# Patient Record
Sex: FEMALE | Race: BLACK OR AFRICAN AMERICAN | Employment: UNEMPLOYED | URBAN - METROPOLITAN AREA
[De-identification: names, ages, dates, MRNs, and addresses within clinical notes are randomized per-mention and may not be internally consistent; named-entity substitution may affect disease eponyms.]

---

## 2017-01-04 ENCOUNTER — OFFICE VISIT (OUTPATIENT)
Dept: PAIN MANAGEMENT | Age: 47
End: 2017-01-04

## 2017-01-04 VITALS — SYSTOLIC BLOOD PRESSURE: 135 MMHG | DIASTOLIC BLOOD PRESSURE: 69 MMHG | HEART RATE: 53 BPM

## 2017-01-04 DIAGNOSIS — M54.42 CHRONIC BILATERAL LOW BACK PAIN WITH BILATERAL SCIATICA: Primary | ICD-10-CM

## 2017-01-04 DIAGNOSIS — M51.36 DDD (DEGENERATIVE DISC DISEASE), LUMBAR: ICD-10-CM

## 2017-01-04 DIAGNOSIS — M48.062 SPINAL STENOSIS, LUMBAR REGION, WITH NEUROGENIC CLAUDICATION: ICD-10-CM

## 2017-01-04 DIAGNOSIS — Z98.84 GASTRIC BYPASS STATUS FOR OBESITY: ICD-10-CM

## 2017-01-04 DIAGNOSIS — G47.01 INSOMNIA SECONDARY TO CHRONIC PAIN: ICD-10-CM

## 2017-01-04 DIAGNOSIS — G89.4 CHRONIC PAIN SYNDROME: ICD-10-CM

## 2017-01-04 DIAGNOSIS — M47.816 OSTEOARTHRITIS OF LUMBAR SPINE, UNSPECIFIED SPINAL OSTEOARTHRITIS COMPLICATION STATUS: ICD-10-CM

## 2017-01-04 DIAGNOSIS — G89.29 INSOMNIA SECONDARY TO CHRONIC PAIN: ICD-10-CM

## 2017-01-04 DIAGNOSIS — M54.41 CHRONIC BILATERAL LOW BACK PAIN WITH BILATERAL SCIATICA: Primary | ICD-10-CM

## 2017-01-04 DIAGNOSIS — G89.29 CHRONIC BILATERAL LOW BACK PAIN WITH BILATERAL SCIATICA: Primary | ICD-10-CM

## 2017-01-04 RX ORDER — FENTANYL 100 UG/H
1 PATCH TRANSDERMAL
Qty: 15 PATCH | Refills: 0 | Status: SHIPPED | OUTPATIENT
Start: 2017-02-03 | End: 2017-03-30 | Stop reason: SDUPTHER

## 2017-01-04 RX ORDER — OXYCODONE AND ACETAMINOPHEN 10; 325 MG/1; MG/1
1 TABLET ORAL
Qty: 150 TAB | Refills: 0 | Status: SHIPPED | OUTPATIENT
Start: 2017-03-02 | End: 2017-03-30 | Stop reason: SDUPTHER

## 2017-01-04 RX ORDER — ZOLPIDEM TARTRATE 12.5 MG/1
12.5 TABLET, FILM COATED, EXTENDED RELEASE ORAL
Qty: 30 TAB | Refills: 2 | Status: SHIPPED | OUTPATIENT
Start: 2017-01-04 | End: 2017-03-30

## 2017-01-04 RX ORDER — GABAPENTIN 400 MG/1
400 CAPSULE ORAL 3 TIMES DAILY
Qty: 90 CAP | Refills: 2 | Status: SHIPPED | OUTPATIENT
Start: 2017-01-04 | End: 2017-02-03

## 2017-01-04 RX ORDER — FENTANYL 100 UG/H
1 PATCH TRANSDERMAL
Qty: 15 PATCH | Refills: 0 | Status: SHIPPED | OUTPATIENT
Start: 2017-03-02 | End: 2017-03-30 | Stop reason: SDUPTHER

## 2017-01-04 RX ORDER — OXYCODONE AND ACETAMINOPHEN 10; 325 MG/1; MG/1
1 TABLET ORAL
Qty: 150 TAB | Refills: 0 | Status: SHIPPED | OUTPATIENT
Start: 2017-02-03 | End: 2017-03-30 | Stop reason: SDUPTHER

## 2017-01-04 RX ORDER — METAXALONE 800 MG/1
TABLET ORAL
Qty: 90 TAB | Refills: 3 | Status: SHIPPED | OUTPATIENT
Start: 2017-01-04 | End: 2017-03-30

## 2017-01-04 RX ORDER — OXYCODONE AND ACETAMINOPHEN 10; 325 MG/1; MG/1
1 TABLET ORAL
Qty: 150 TAB | Refills: 0 | Status: SHIPPED | OUTPATIENT
Start: 2017-01-04 | End: 2017-03-30 | Stop reason: SDUPTHER

## 2017-01-04 RX ORDER — FENTANYL 100 UG/H
1 PATCH TRANSDERMAL
Qty: 15 PATCH | Refills: 0 | Status: SHIPPED | OUTPATIENT
Start: 2017-01-04 | End: 2017-03-30 | Stop reason: SDUPTHER

## 2017-01-04 NOTE — MR AVS SNAPSHOT
Visit Information Date & Time Provider Department Dept. Phone Encounter #  
 1/4/2017 10:45 AM Wilmer Carballo MD [address] [place] [address] Pain Management [phone] Follow-up Instructions Return in about 2 months (around 3/4/2017). Upcoming Health Maintenance Date Due DTaP/Tdap/Td series (1 - Tdap) 5/31/1989 PAP AKA CERVICAL CYTOLOGY 5/31/1989 INFLUENZA AGE 9 TO ADULT 8/1/2016 Allergies as of 1/4/2017  Review Complete On: 1/4/2017 By: Wilmer Carballo MD  
  
 Severity Noted Reaction Type Reactions Latex  10/31/2011    Rash Ibuprofen  06/26/2014    Nausea and Vomiting Current Immunizations  Never Reviewed No immunizations on file. Not reviewed this visit You Were Diagnosed With   
  
 Codes Comments Chronic bilateral low back pain with bilateral sciatica    -  Primary ICD-10-CM: M54.42, M54.41, G89.29 ICD-9-CM: 724.2, 724.3, 338.29 Insomnia secondary to chronic pain     ICD-10-CM: G89.29, G47.01 
ICD-9-CM: 338.29, 327.01 Chronic pain syndrome     ICD-10-CM: G89.4 ICD-9-CM: 338.4 Gastric bypass status for obesity     ICD-10-CM: Z98.84 ICD-9-CM: V45.86 Osteoarthritis of lumbar spine, unspecified spinal osteoarthritis complication status     CDH-62-FT: M47.816 ICD-9-CM: 721.3 DDD (degenerative disc disease), lumbar     ICD-10-CM: M51.36 
ICD-9-CM: 722.52 Spinal stenosis, lumbar region, with neurogenic claudication     ICD-10-CM: M48.06 
ICD-9-CM: 724.03 Vitals BP Pulse OB Status Smoking Status 135/69 (!) 53 Hysterectomy Never Smoker Vitals History Preferred Pharmacy Pharmacy Name Phone [facility], [address] Your Updated Medication List  
  
   
This list is accurate as of: 1/4/17 11:49 AM.  Always use your most recent med list.  
  
  
  
  
 Back Brace Misc 1 Units by Does Not Apply route daily. As directed * fentaNYL 100 mcg/hr PATCH Commonly known as:  DURAGESIC  
1 Patch by TransDERmal route every fourty-eight (48) hours for 30 days. For chronic pain. Mylan, Sandoz, or Mallinckrodt brands only. * fentaNYL 100 mcg/hr PATCH Commonly known as:  DURAGESIC  
1 Patch by TransDERmal route every fourty-eight (48) hours for 30 days. For chronic pain. Mylan, Sandoz, or Mallinckrodt brands only. Start taking on:  2/3/2017 * fentaNYL 100 mcg/hr PATCH Commonly known as:  DURAGESIC  
1 Patch by TransDERmal route every fourty-eight (48) hours for 30 days. For chronic pain. For chronic pain. Mylan, Sandoz, or Mallinckrodt brands only. Start taking on:  3/2/2017  
  
 gabapentin 400 mg capsule Commonly known as:  NEURONTIN Take 1 Cap by mouth three (3) times daily for 30 days. metaxalone 800 mg tablet Commonly known as:  SKELAXIN  
TAKE ONE TABLET BY MOUTH THREE TIMES A DAY  
  
 * oxyCODONE-acetaminophen  mg per tablet Commonly known as:  PERCOCET 10 Take 1 Tab by mouth five (5) times daily for 30 days. Max Daily Amount: 5 Tabs. * oxyCODONE-acetaminophen  mg per tablet Commonly known as:  PERCOCET 10 Take 1 Tab by mouth five (5) times daily for 30 days. Max Daily Amount: 5 Tabs. Start taking on:  2/3/2017 * oxyCODONE-acetaminophen  mg per tablet Commonly known as:  PERCOCET 10 Take 1 Tab by mouth five (5) times daily for 30 days. Max Daily Amount: 5 Tabs. Start taking on:  3/2/2017 ZOLOFT 50 mg tablet Generic drug:  sertraline Take 50 mg by mouth daily. zolpidem CR 12.5 mg tablet Commonly known as:  AMBIEN CR Take 1 Tab by mouth nightly as needed for Sleep. * Notice: This list has 6 medication(s) that are the same as other medications prescribed for you. Read the directions carefully, and ask your doctor or other care provider to review them with you. Prescriptions Printed Refills  
 fentaNYL (DURAGESIC) 100 mcg/hr PATCH 0 Si Patch by TransDERmal route every fourty-eight (48) hours for 30 days. For chronic pain. Mylan, Sandoz, or Mallinckrodt brands only. Class: Print Route: TransDERmal  
 oxyCODONE-acetaminophen (PERCOCET 10)  mg per tablet 0 Sig: Take 1 Tab by mouth five (5) times daily for 30 days. Max Daily Amount: 5 Tabs. Class: Print Route: Oral  
 zolpidem CR (AMBIEN CR) 12.5 mg tablet 2 Sig: Take 1 Tab by mouth nightly as needed for Sleep. Class: Print Route: Oral  
 fentaNYL (DURAGESIC) 100 mcg/hr PATCH 0 Starting on: 3/2/2017 Si Patch by TransDERmal route every fourty-eight (48) hours for 30 days. For chronic pain. For chronic pain. Mylan, Sandoz, or Mallinckrodt brands only. Class: Print Route: TransDERmal  
 oxyCODONE-acetaminophen (PERCOCET 10)  mg per tablet 0 Starting on: 3/2/2017 Sig: Take 1 Tab by mouth five (5) times daily for 30 days. Max Daily Amount: 5 Tabs. Class: Print Route: Oral  
 fentaNYL (DURAGESIC) 100 mcg/hr PATCH 0 Starting on: 2/3/2017 Si Patch by TransDERmal route every fourty-eight (48) hours for 30 days. For chronic pain. Mylan, Sandoz, or Mallinckrodt brands only. Class: Print Route: TransDERmal  
 oxyCODONE-acetaminophen (PERCOCET 10)  mg per tablet 0 Starting on: 2/3/2017 Sig: Take 1 Tab by mouth five (5) times daily for 30 days. Max Daily Amount: 5 Tabs. Class: Print Route: Oral  
  
Prescriptions Sent to Pharmacy Refills  
 metaxalone (SKELAXIN) 800 mg tablet 3 Sig: TAKE ONE TABLET BY MOUTH THREE TIMES A DAY Class: Normal  
 Pharmacy: [address] [place] [address], [address] [place] [address] Ph #: [phone]  
 gabapentin (NEURONTIN) 400 mg capsule 2 Sig: Take 1 Cap by mouth three (3) times daily for 30 days.   
 Class: Normal  
 Pharmacy: [facility], [facility] #: [phone] Route: Oral  
  
Follow-up Instructions Return in about 2 months (around 3/4/2017). Introducing [facility] SERVICES! [address] introduces [employer] patient portal. Now you can access parts of your medical record, email your doctor's office, and request medication refills online. 1. In your internet browser, go to [email or web address] 2. Click on the First Time User? Click Here link in the Sign In box. You will see the New Member Sign Up page. 3. Enter your [employer] Access Code exactly as it appears below. You will not need to use this code after youve completed the sign-up process. If you do not sign up before the expiration date, you must request a new code. · [employer] Access Code: [email or web address] Expires: 4/4/2017 11:45 AM 
 
4. Enter the last four digits of your Social Security Number (xxxx) and Date of Birth (mm/dd/yyyy) as indicated and click Submit. You will be taken to the next sign-up page. 5. Create a [employer] ID. This will be your [employer] login ID and cannot be changed, so think of one that is secure and easy to remember. 6. Create a [employer] password. You can change your password at any time. 7. Enter your Password Reset Question and Answer. This can be used at a later time if you forget your password. 8. Enter your e-mail address. You will receive e-mail notification when new information is available in [address] 9. Click Sign Up. You can now view and download portions of your medical record. 10. Click the Download Summary menu link to download a portable copy of your medical information. If you have questions, please visit the Frequently Asked Questions section of the [employer] website. Remember, [employer] is NOT to be used for urgent needs. For medical emergencies, dial 911. Now available from your iPhone and Android! Please provide this summary of care documentation to your next provider. Your primary care clinician is listed as BERTO GONZALEZ. If you have any questions after today's visit, please call [phone].

## 2017-01-04 NOTE — PROGRESS NOTES
Nursing Notes    Patient presents to the office today in follow-up. Reviewed medications with counts as follows:    Rx Date filled Qty Dispensed Pill Count Last Dose Short   duragesic 100 mcg 12/21/16 15 7 1 on no   Percocet 10/325 12/13/16 150 37 Today. 3 doses no   Ms. Geo Aden has a reminder for a \"due or due soon\" health maintenance. I have asked that she contact her primary care provider for follow-up on this health maintenance. POC UDS was not performed in office today    Any new labs or imaging since last appointment? NO    Have you been to an emergency room (ER) or urgent care clinic since your last visit? NO            Have you been hospitalized since your last visit? NO     If yes, where, when, and reason for visit? Have you seen or consulted any other health care providers outside of the [facility]  since your last visit? NO     If yes, where, when, and reason for visit?

## 2017-01-04 NOTE — PROGRESS NOTES
HISTORY OF PRESENT ILLNESS  Real Carcamo is a 48 y.o. female. HPI Comments: Meds help with pain control and quality of life. No new side effects reported today. No new medical problems reported today. Visit survey reviewed, and will be scanned. Recent Average pain level (out of 10). -- 7  Chief complaint, low back pain  Chronic pain  Using fentanyl patch 100 µg every 48 hour  Percocet 10 mg 5 times a day as needed  Metaxalone 800 mg 3 times a day as needed  Extended release Ambien 12.5 mg to help with sleep as needed  Gabapentin 400 mg 3 times a day  Medication helps improve general activity, mood, walking, sleep  She was previously being seen every 3 months, she would like to return back to, every 3 months             Review of Systems   Constitutional: Negative for chills and fever. HENT: Negative for ear discharge and ear pain. Eyes: Negative for pain and discharge. Respiratory: Negative for hemoptysis and shortness of breath. Cardiovascular: Negative for chest pain and leg swelling. Gastrointestinal: Negative for blood in stool and melena. Musculoskeletal: Positive for back pain. Negative for falls. Skin: Negative for itching and rash. Neurological: Negative for dizziness. Psychiatric/Behavioral: Negative for hallucinations and suicidal ideas. Physical Exam   Constitutional: She appears well-developed and well-nourished. She is cooperative. She does not have a sickly appearance. HENT:   Head: Normocephalic and atraumatic. Right Ear: External ear normal. No drainage. Left Ear: External ear normal. No drainage. Nose: Nose normal.   Eyes: Lids are normal. Right eye exhibits no discharge. Left eye exhibits no discharge. Right conjunctiva has no hemorrhage. Left conjunctiva has no hemorrhage. Neck: Neck supple. No tracheal deviation present. No thyroid mass present. Pulmonary/Chest: Effort normal. No respiratory distress. Neurological: She is alert.  No cranial nerve deficit. Skin: Skin is intact. No rash noted. Psychiatric: Her speech is normal. Her affect is not angry. She does not express inappropriate judgment. Nursing note and vitals reviewed. ASSESSMENT and PLAN  Encounter Diagnoses   Name Primary?  Chronic bilateral low back pain with bilateral sciatica Yes    Insomnia secondary to chronic pain     Chronic pain syndrome     Gastric bypass status for obesity     Osteoarthritis of lumbar spine, unspecified spinal osteoarthritis complication status     DDD (degenerative disc disease), lumbar     Spinal stenosis, lumbar region, with neurogenic claudication    No signs of addiction or diversion. The primary Dxes. ,including pain are controlled. Pain/Pain control/Meds and Quality Of Life have been reviewed. Nonpharmacologic therapy and non-opioid pharmacologic therapy are always considered. If opioid therapy is prescribed, this is only if the expected benefits for both pain and function are anticipated to outweigh risks. Possible changes to treatment plan considered. Support/education given as needed. Today-medications are as listed. No significant changes to medications. Follow up -- 3 months.

## 2017-02-12 ENCOUNTER — HOSPITAL ENCOUNTER (EMERGENCY)
Age: 47
Discharge: HOME | End: 2017-02-12
Attending: EMERGENCY MEDICINE
Payer: MEDICARE

## 2017-02-12 VITALS
SYSTOLIC BLOOD PRESSURE: 145 MMHG | HEART RATE: 63 BPM | RESPIRATION RATE: 18 BRPM | BODY MASS INDEX: 31.92 KG/M2 | DIASTOLIC BLOOD PRESSURE: 89 MMHG | TEMPERATURE: 98 F | OXYGEN SATURATION: 99 % | HEIGHT: 64 IN | WEIGHT: 187 LBS

## 2017-02-12 DIAGNOSIS — G89.29 CHRONIC LEFT-SIDED LOW BACK PAIN WITH LEFT-SIDED SCIATICA: Primary | ICD-10-CM

## 2017-02-12 DIAGNOSIS — M54.42 CHRONIC LEFT-SIDED LOW BACK PAIN WITH LEFT-SIDED SCIATICA: Primary | ICD-10-CM

## 2017-02-12 PROCEDURE — 99282 EMERGENCY DEPT VISIT SF MDM: CPT

## 2017-02-12 NOTE — ED NOTES
I have reviewed discharge instructions with the patient. The patient verbalized understanding. Current Discharge Medication List      CONTINUE these medications which have NOT CHANGED    Details   metaxalone (SKELAXIN) 800 mg tablet TAKE ONE TABLET BY MOUTH THREE TIMES A DAY  Qty: 90 Tab, Refills: 3      zolpidem CR (AMBIEN CR) 12.5 mg tablet Take 1 Tab by mouth nightly as needed for Sleep. Qty: 30 Tab, Refills: 2    Associated Diagnoses: Insomnia secondary to chronic pain      !! fentaNYL (DURAGESIC) 100 mcg/hr PATCH 1 Patch by TransDERmal route every fourty-eight (48) hours for 30 days. For chronic pain. For chronic pain. Mylan, Sandoz, or Mallinckrodt brands only. Qty: 15 Patch, Refills: 0      !! oxyCODONE-acetaminophen (PERCOCET 10)  mg per tablet Take 1 Tab by mouth five (5) times daily for 30 days. Max Daily Amount: 5 Tabs. Qty: 150 Tab, Refills: 0      !! fentaNYL (DURAGESIC) 100 mcg/hr PATCH 1 Patch by TransDERmal route every fourty-eight (48) hours for 30 days. For chronic pain. Mylan, Sandoz, or Mallinckrodt brands only. Qty: 15 Patch, Refills: 0      !! oxyCODONE-acetaminophen (PERCOCET 10)  mg per tablet Take 1 Tab by mouth five (5) times daily for 30 days. Max Daily Amount: 5 Tabs. Qty: 150 Tab, Refills: 0      Back Brace misc 1 Units by Does Not Apply route daily. As directed  Qty: 1 Device, Refills: 1      sertraline (ZOLOFT) 50 mg tablet Take 50 mg by mouth daily. !! - Potential duplicate medications found. Please discuss with provider.       Patient armband removed and shredded

## 2017-02-12 NOTE — DISCHARGE INSTRUCTIONS
Learning About How to Have a Healthy Back  What causes back pain? Back pain is often caused by overuse, strain, or injury. For example, people often hurt their backs playing sports or working in the yard, being jolted in a car accident, or lifting something too heavy. Aging plays a part too. Your bones and muscles tend to lose strength as you age, which makes injury more likely. The spongy discs between the bones of the spine (vertebrae) may suffer from wear and tear and no longer provide enough cushion between the bones. A disc that bulges or breaks open (herniated disc) can press on nerves, causing back pain. In some people, back pain is the result of arthritis, broken vertebrae caused by bone loss (osteoporosis), illness, or a spine problem. Although most people have back pain at one time or another, there are steps you can take to make it less likely. How can you have a healthy back? Reduce stress on your back through good posture  Slumping or slouching alone may not cause low back pain. But after the back has been strained or injured, bad posture can make pain worse. · Sleep in a position that maintains your back's normal curves and on a mattress that feels comfortable. Sleep on your side with a pillow between your knees, or sleep on your back with a pillow under your knees. These positions can reduce strain on your back. · Stand and sit up straight. \"Good posture\" generally means your ears, shoulders, and hips are in a straight line. · If you must stand for a long time, put one foot on a stool, ledge, or box. Switch feet every now and then. · Sit in a chair that is low enough to let you place both feet flat on the floor with both knees nearly level with your hips. If your chair or desk is too high, use a footrest to raise your knees. Place a small pillow, a rolled-up towel, or a lumbar roll in the curve of your back if you need extra support.   · Try a kneeling chair, which helps tilt your hips forward. This takes pressure off your lower back. · Try sitting on an exercise ball. It can rock from side to side, which helps keep your back loose. · When driving, keep your knees nearly level with your hips. Sit straight, and drive with both hands on the steering wheel. Your arms should be in a slightly bent position. Reduce stress on your back through careful lifting  · Squat down, bending at the hips and knees only. If you need to, put one knee to the floor and extend your other knee in front of you, bent at a right angle (half kneeling). · Press your chest straight forward. This helps keep your upper back straight while keeping a slight arch in your low back. · Hold the load as close to your body as possible, at the level of your belly button (navel). · Use your feet to change direction, taking small steps. · Lead with your hips as you change direction. Keep your shoulders in line with your hips as you move. · Set down your load carefully, squatting with your knees and hips only. Exercise and stretch your back  · Do some exercise on most days of the week, if your doctor says it is okay. You can walk, run, swim, or cycle. · Stretch your back muscles. Here are a few exercises to try:  Edgar Dys on your back, and gently pull one bent knee to your chest. Put that foot back on the floor, and then pull the other knee to your chest.  ¨ Do pelvic tilts. Lie on your back with your knees bent. Tighten your stomach muscles. Pull your belly button (navel) in and up toward your ribs. You should feel like your back is pressing to the floor and your hips and pelvis are slightly lifting off the floor. Hold for 6 seconds while breathing smoothly. ¨ Sit with your back flat against a wall. · Keep your core muscles strong. The muscles of your back, belly (abdomen), and buttocks support your spine. ¨ Pull in your belly and imagine pulling your navel toward your spine. Hold this for 6 seconds, then relax.  Remember to keep breathing normally as you tense your muscles. ¨ Do curl-ups. Always do them with your knees bent. Keep your low back on the floor, and curl your shoulders toward your knees using a smooth, slow motion. Keep your arms folded across your chest. If this bothers your neck, try putting your hands behind your neck (not your head), with your elbows spread apart. ¨ Lie on your back with your knees bent and your feet flat on the floor. Tighten your belly muscles, and then push with your feet and raise your buttocks up a few inches. Hold this position 6 seconds as you continue to breathe normally, then lower yourself slowly to the floor. Repeat 8 to 12 times. ¨ If you like group exercise, try Pilates or yoga. These classes have poses that strengthen the core muscles. Lead a healthy lifestyle  · Stay at a healthy weight to avoid strain on your back. · Do not smoke. Smoking increases the risk of osteoporosis, which weakens the spine. If you need help quitting, talk to your doctor about stop-smoking programs and medicines. These can increase your chances of quitting for good. Where can you learn more? Go to http://christiane-maryanne.info/. Enter L315 in the search box to learn more about \"Learning About How to Have a Healthy Back. \"  Current as of: May 23, 2016  Content Version: 11.1  © 5824-8178 [employer]. Care instructions adapted under license by [employer] (which disclaims liability or warranty for this information). If you have questions about a medical condition or this instruction, always ask your healthcare professional. [place] [address] any warranty or liability for your use of this information.

## 2017-02-12 NOTE — ED PROVIDER NOTES
Patient is a 48 y.o. female presenting with hip pain, knee pain, and leg pain. The history is provided by the patient. Hip Injury      Knee Pain      Leg Pain      Belinda Hoffman is a 48 y.o. female presents with left leg and knee pain. States has been going on for the past three days. Denies trauma. Pt has history of leg and knee pain. Pt is currently being seen by Pain Management. States has not been to see pain management for this complaint.  has not seen ortho.  has had knee injection in the past and wants a knee injection at this time.  pain medication that she is given is not working and would like new pain medication. Past Medical History:   Diagnosis Date    Anemia     Chronic low back pain     Constipation     Dysmenorrhea     Eczema     Fatigue     Insomnia     Iron deficiency anemia     Neurological disorder      pain, difficulty writing    Secondary thrombocytosis        Past Surgical History:   Procedure Laterality Date    Hx breast reduction      Hx tubal ligation       bilateral    Hx hysterectomy      Hx hysterectomy  2012    Hx gastric bypass  1997    Hx abdominoplasty           Family History:   Problem Relation Age of Onset    Cancer Other      NOS    Heart Disease Other      NOS    Stroke Father     Heart Attack Father     Breast Cancer Mother        Social History     Social History    Marital status:      Spouse name: N/A    Number of children: N/A    Years of education: N/A     Occupational History    Not on file. Social History Main Topics    Smoking status: Never Smoker    Smokeless tobacco: Not on file    Alcohol use No    Drug use: No    Sexual activity: Yes     Birth control/ protection: Surgical      Comment: Hysterectomy     Other Topics Concern    Not on file     Social History Narrative         ALLERGIES: Latex and Ibuprofen    Review of Systems  Constitutional:  Denies malaise, fever, chills.    Head:  Denies injury. Face:  Denies injury or pain. ENMT:  Denies sore throat. Neck:  Denies injury or pain. Chest:  Denies injury. Cardiac:  Denies chest pain or palpitations. Respiratory:  Denies cough, wheezing, difficulty breathing, shortness of breath. GI/ABD:  Denies injury, pain, distention, nausea, vomiting, diarrhea. :  Denies injury, pain, dysuria or urgency. Back:  pain  Pelvis:  Denies injury or pain. Extremity/MS:  hip, leg pain  Neuro:  Denies headache, LOC, dizziness, neurologic symptoms/deficits/paresthesias. Skin: Denies injury, rash, itching or skin changes. Vitals:    02/12/17 1146   BP: 145/89   Pulse: 63   Resp: 18   Temp: 98 °F (36.7 °C)   SpO2: 99%   Weight: 84.8 kg (187 lb)   Height: 5' 4\" (1.626 m)            Physical Exam   Nursing note and vitals reviewed. CONSTITUTIONAL: Alert, in no apparent distress; well-developed; well-nourished. HEAD:  Normocephalic, atraumatic. RESP: Chest clear, equal breath sounds. CV: S1 and S2 WNL; No murmurs, gallops or rubs. GI: Abdomen soft and non-tender. No masses or organomegaly. BACK: FROM, 5/5 strength, 2+DTR's, mild lumbar paraspinal tenderness no erythema, no edema, no ecchymosis,(-) deformity, swelling, skin changes, midline tenderness or crepitus. (-) step offs. Neg SLR bilaterlly. Full sensation to deep and light palpation bilaterally. (-) foot drop, Bilateral hips FROM, mild tenderness over left buttock  UPPER EXT:  Normal inspection. LOWER EXT: Normal inspection, left knee FROM, 5/5 strength, n/v intact, no joint line tenderness, no swelling, effusion or edema, no ecchymosis,  negative Lachman, negative drawer, negative apply grind, Varus Valgus laxity equal bilat. NEURO:  strength 5/5 and sym, sensation intact. SKIN: No rashes; Normal for age and stage. PSYCH:  Alert and oriented, normal affect.          MDM  Number of Diagnoses or Management Options  Chronic left-sided low back pain with left-sided sciatica: Diagnosis management comments: DDx: sciatica, spinal stenosis, arthritis, DJD, spondylitis, muscular pain/spasm, Fx (traumatic, compression, etc.), cauda equina, epidural abscess, UTI, pyelo, STD,  Ynfg-Kdir-Bitljl syndrome, kidney stones, preg, ectopic, ovarian cyst, ovarian torsion, GI etiology (constipation, pancreatitis, hepatitis, gastritis, diverticulitis, colitis, gastric cancer, etc), acute abdomen (appendicitis, SBO, etc.), AAA, malignancy  IMPRESSION AND MEDICAL DECISION MAKING:  Based upon the patient's presentation with noted HPI and PE, along with the work up done in the emergency department, I believe that the patient has chronic pain and is currently seeing pain management. Advised pt that we do not do knee injections and that she should follow up with her Pain management provider as well as Ortho. Pt states that she does not have Orhto and would like the name of an Ortho.       ED Course       Procedures

## 2017-03-30 ENCOUNTER — OFFICE VISIT (OUTPATIENT)
Dept: PAIN MANAGEMENT | Age: 47
End: 2017-03-30

## 2017-03-30 VITALS
WEIGHT: 187 LBS | BODY MASS INDEX: 32.1 KG/M2 | HEART RATE: 71 BPM | DIASTOLIC BLOOD PRESSURE: 68 MMHG | SYSTOLIC BLOOD PRESSURE: 118 MMHG

## 2017-03-30 DIAGNOSIS — M54.42 CHRONIC BILATERAL LOW BACK PAIN WITH BILATERAL SCIATICA: Primary | ICD-10-CM

## 2017-03-30 DIAGNOSIS — M54.41 CHRONIC BILATERAL LOW BACK PAIN WITH BILATERAL SCIATICA: Primary | ICD-10-CM

## 2017-03-30 DIAGNOSIS — G89.29 CHRONIC BILATERAL LOW BACK PAIN WITH BILATERAL SCIATICA: Primary | ICD-10-CM

## 2017-03-30 DIAGNOSIS — M51.36 DDD (DEGENERATIVE DISC DISEASE), LUMBAR: ICD-10-CM

## 2017-03-30 DIAGNOSIS — M48.062 SPINAL STENOSIS, LUMBAR REGION, WITH NEUROGENIC CLAUDICATION: ICD-10-CM

## 2017-03-30 DIAGNOSIS — Z98.84 GASTRIC BYPASS STATUS FOR OBESITY: ICD-10-CM

## 2017-03-30 DIAGNOSIS — M47.816 OSTEOARTHRITIS OF LUMBAR SPINE, UNSPECIFIED SPINAL OSTEOARTHRITIS COMPLICATION STATUS: ICD-10-CM

## 2017-03-30 DIAGNOSIS — G89.4 CHRONIC PAIN SYNDROME: ICD-10-CM

## 2017-03-30 DIAGNOSIS — M19.90 OSTEOARTHRITIS, UNSPECIFIED OSTEOARTHRITIS TYPE, UNSPECIFIED SITE: ICD-10-CM

## 2017-03-30 RX ORDER — GABAPENTIN 400 MG/1
400 CAPSULE ORAL 3 TIMES DAILY
Qty: 90 CAP | Refills: 2 | Status: SHIPPED | OUTPATIENT
Start: 2017-03-30 | End: 2017-06-26 | Stop reason: SDUPTHER

## 2017-03-30 RX ORDER — OXYCODONE AND ACETAMINOPHEN 10; 325 MG/1; MG/1
1 TABLET ORAL
Qty: 150 TAB | Refills: 0 | Status: SHIPPED | OUTPATIENT
Start: 2017-04-29 | End: 2017-05-29

## 2017-03-30 RX ORDER — FENTANYL 100 UG/H
1 PATCH TRANSDERMAL
Qty: 15 PATCH | Refills: 0 | Status: SHIPPED | OUTPATIENT
Start: 2017-04-29 | End: 2017-05-29

## 2017-03-30 RX ORDER — NALOXONE HYDROCHLORIDE 4 MG/.1ML
4 SPRAY NASAL AS NEEDED
Qty: 1 BOTTLE | Refills: 1 | Status: SHIPPED | OUTPATIENT
Start: 2017-03-30 | End: 2019-03-05

## 2017-03-30 RX ORDER — FENTANYL 100 UG/H
1 PATCH TRANSDERMAL
Qty: 15 PATCH | Refills: 0 | Status: SHIPPED | OUTPATIENT
Start: 2017-03-30 | End: 2017-06-26 | Stop reason: SDUPTHER

## 2017-03-30 RX ORDER — FENTANYL 100 UG/H
1 PATCH TRANSDERMAL
Qty: 15 PATCH | Refills: 0 | Status: SHIPPED | OUTPATIENT
Start: 2017-05-28 | End: 2017-06-26 | Stop reason: SDUPTHER

## 2017-03-30 RX ORDER — OXYCODONE AND ACETAMINOPHEN 10; 325 MG/1; MG/1
1 TABLET ORAL
Qty: 150 TAB | Refills: 0 | Status: SHIPPED | OUTPATIENT
Start: 2017-03-30 | End: 2017-06-26 | Stop reason: SDUPTHER

## 2017-03-30 RX ORDER — OXYCODONE AND ACETAMINOPHEN 10; 325 MG/1; MG/1
1 TABLET ORAL
Qty: 150 TAB | Refills: 0 | Status: SHIPPED | OUTPATIENT
Start: 2017-05-28 | End: 2017-06-26 | Stop reason: SDUPTHER

## 2017-03-30 NOTE — PROGRESS NOTES
HISTORY OF PRESENT ILLNESS  Alton Steele is a 48 y.o. female. HPI Comments: Meds help with pain control and quality of life. No new side effects reported today. Visit survey reviewed and will be scanned.  reviewed. Recent average level of pain(out of 10)- 7  Chief complaint low back pain  Chronic pain  Using fentanyl patch 100 µg every 48 hour  Percocet 10 mg 5 times a day as needed    Gabapentin 400 mg 3 times a day  Medication helps improve general activity, mood, walking, sleep, enjoyment of life            Ash[place]      Review of Systems   Constitutional: Negative for chills and fever. HENT: Negative for ear discharge and ear pain. Eyes: Negative for pain and discharge. Respiratory: Negative for hemoptysis and shortness of breath. Cardiovascular: Negative for chest pain and leg swelling. Gastrointestinal: Negative for blood in stool and melena. Musculoskeletal: Positive for back pain. Negative for falls. Skin: Negative for itching and rash. Neurological: Negative for dizziness. Psychiatric/Behavioral: Negative for hallucinations and suicidal ideas. Physical Exam   Constitutional: She appears well-developed and well-nourished. She is cooperative. She does not have a sickly appearance. HENT:   Head: Normocephalic and atraumatic. Right Ear: External ear normal. No drainage. Left Ear: External ear normal. No drainage. Nose: Nose normal.   Eyes: Lids are normal. Right eye exhibits no discharge. Left eye exhibits no discharge. Right conjunctiva has no hemorrhage. Left conjunctiva has no hemorrhage. Neck: Neck supple. No tracheal deviation present. No thyroid mass present. Pulmonary/Chest: Effort normal. No respiratory distress. Neurological: She is alert. No cranial nerve deficit. Skin: Skin is intact. No rash noted. Psychiatric: Her speech is normal. Her affect is not angry. She does not express inappropriate judgment.    Nursing note and vitals reviewed. ASSESSMENT and PLAN  Encounter Diagnoses   Name Primary?  Chronic bilateral low back pain with bilateral sciatica Yes    Chronic pain syndrome     Osteoarthritis, unspecified osteoarthritis type, unspecified site     Gastric bypass status for obesity     Osteoarthritis of lumbar spine, unspecified spinal osteoarthritis complication status     DDD (degenerative disc disease), lumbar     Spinal stenosis, lumbar region, with neurogenic claudication    No indicators for medication misuse.  reviewed. The majority of today's visit was spent counseling and coordinating care. Total visit time40 minutes. Additional time spent reviewing medications. Adderall, Zoloft, Topamax prescribed by different clinic. She is no longer using Ambien. She reports, she is on a different sleep medicine which is prescribed by her primary care physician. Additional time also spent reviewing Narcan. Because the patient's current regimen places him/her at increased risk for possible overdose, a prescription for the lock so nasal spray is being provided. The patient understands that this medication is only to be used in the setting of a possible overdose and that inadvertent use of this medication could precipitate overt withdrawal.  I discussed naloxone with the patient today. In addition, the patient has received the naloxone instruction sheet. Pain Meds and Quality Of Life have been reviewed. Nonpharmacologic therapy and non-opioid pharmacologic therapy were considered. If opioid therapy is prescribed, this is only if the expected benefits are anticipated to outweigh risks. Possible changes to treatment plan considered. Support/education given as needed. Today-medications are as listed. No significant changes to medications. Follow up -- 2 months.

## 2017-03-30 NOTE — MR AVS SNAPSHOT
Visit Information Date & Time Provider Department Dept. Phone Encounter #  
 3/30/2017  8:45 AM Joanna Grullon MD [facility] [id number] Follow-up Instructions Return in about 3 months (around 6/30/2017). Follow-up and Disposition History Upcoming Health Maintenance Date Due DTaP/Tdap/Td series (1 - Tdap) 5/31/1989 PAP AKA CERVICAL CYTOLOGY 5/31/1989 INFLUENZA AGE 9 TO ADULT 8/1/2016 Allergies as of 3/30/2017  Review Complete On: 3/30/2017 By: Joanna Grullon MD  
  
 Severity Noted Reaction Type Reactions Latex  10/31/2011    Rash Ibuprofen  06/26/2014    Nausea and Vomiting Current Immunizations  Never Reviewed No immunizations on file. Not reviewed this visit You Were Diagnosed With   
  
 Codes Comments Chronic bilateral low back pain with bilateral sciatica    -  Primary ICD-10-CM: M54.42, M54.41, G89.29 ICD-9-CM: 724.2, 724.3, 338.29 Chronic pain syndrome     ICD-10-CM: G89.4 ICD-9-CM: 338.4 Osteoarthritis, unspecified osteoarthritis type, unspecified site     ICD-10-CM: M19.90 ICD-9-CM: 715.90 Gastric bypass status for obesity     ICD-10-CM: Z98.84 ICD-9-CM: V45.86 Osteoarthritis of lumbar spine, unspecified spinal osteoarthritis complication status     JCO-90-CE: M47.816 ICD-9-CM: 721.3 DDD (degenerative disc disease), lumbar     ICD-10-CM: M51.36 
ICD-9-CM: 722.52 Spinal stenosis, lumbar region, with neurogenic claudication     ICD-10-CM: M48.06 
ICD-9-CM: 724.03 Vitals BP Pulse Weight(growth percentile) BMI OB Status Smoking Status 118/68 (BP 1 Location: Left arm, BP Patient Position: Sitting) 71 187 lb (84.8 kg) 32.1 kg/m2 Hysterectomy Never Smoker Vitals History BMI and BSA Data Body Mass Index Body Surface Area  
 32.1 kg/m 2 1.96 m 2 Preferred Pharmacy Pharmacy Name Phone [phone] [facility], [address] [facility] Your Updated Medication List  
  
   
This list is accurate as of: 3/30/17  9:19 AM.  Always use your most recent med list.  
  
  
  
  
 Back Brace Misc  
1 Units by Does Not Apply route daily. As directed * fentaNYL 100 mcg/hr PATCH Commonly known as:  DURAGESIC  
1 Patch by TransDERmal route every fourty-eight (48) hours for 30 days. For chronic pain. For chronic pain. Mylan, Sandoz, or Mallinckrodt brands only. * fentaNYL 100 mcg/hr PATCH Commonly known as:  DURAGESIC  
1 Patch by TransDERmal route every fourty-eight (48) hours for 30 days. For chronic pain. Mylan, Sandoz, or Mallinckrodt brands only. Start taking on:  4/29/2017 * fentaNYL 100 mcg/hr PATCH Commonly known as:  DURAGESIC  
1 Patch by TransDERmal route every fourty-eight (48) hours for 30 days. For chronic pain. Mylan, Sandoz, or Mallinckrodt brands only. Start taking on:  5/28/2017  
  
 gabapentin 400 mg capsule Commonly known as:  NEURONTIN Take 1 Cap by mouth three (3) times daily for 30 days. naloxone 4 mg/actuation Spry 4 mg by Nasal route as needed. * oxyCODONE-acetaminophen  mg per tablet Commonly known as:  PERCOCET 10 Take 1 Tab by mouth five (5) times daily for 30 days. Max Daily Amount: 5 Tabs. * oxyCODONE-acetaminophen  mg per tablet Commonly known as:  PERCOCET 10 Take 1 Tab by mouth five (5) times daily for 30 days. Max Daily Amount: 5 Tabs. Start taking on:  4/29/2017 * oxyCODONE-acetaminophen  mg per tablet Commonly known as:  PERCOCET 10 Take 1 Tab by mouth five (5) times daily for 30 days. Max Daily Amount: 5 Tabs. Start taking on:  5/28/2017 ZOLOFT 50 mg tablet Generic drug:  sertraline Take 50 mg by mouth daily. * Notice:   This list has 6 medication(s) that are the same as other medications prescribed for you. Read the directions carefully, and ask your doctor or other care provider to review them with you. Prescriptions Printed Refills  
 fentaNYL (DURAGESIC) 100 mcg/hr PATCH 0 Si Patch by TransDERmal route every fourty-eight (48) hours for 30 days. For chronic pain. For chronic pain. Mylan, Sandoz, or Mallinckrodt brands only. Class: Print Route: TransDERmal  
 oxyCODONE-acetaminophen (PERCOCET 10)  mg per tablet 0 Sig: Take 1 Tab by mouth five (5) times daily for 30 days. Max Daily Amount: 5 Tabs. Class: Print Route: Oral  
 fentaNYL (DURAGESIC) 100 mcg/hr PATCH 0 Starting on: 2017 Si Patch by TransDERmal route every fourty-eight (48) hours for 30 days. For chronic pain. Mylan, Sandoz, or Mallinckrodt brands only. Class: Print Route: TransDERmal  
 oxyCODONE-acetaminophen (PERCOCET 10)  mg per tablet 0 Starting on: 2017 Sig: Take 1 Tab by mouth five (5) times daily for 30 days. Max Daily Amount: 5 Tabs. Class: Print Route: Oral  
 fentaNYL (DURAGESIC) 100 mcg/hr PATCH 0 Starting on: 2017 Si Patch by TransDERmal route every fourty-eight (48) hours for 30 days. For chronic pain. Mylan, Sandoz, or Mallinckrodt brands only. Class: Print Route: TransDERmal  
 oxyCODONE-acetaminophen (PERCOCET 10)  mg per tablet 0 Starting on: 2017 Sig: Take 1 Tab by mouth five (5) times daily for 30 days. Max Daily Amount: 5 Tabs. Class: Print Route: Oral  
 naloxone 4 mg/actuation spry 1 Si mg by Nasal route as needed. Class: Print Route: Nasal  
 gabapentin (NEURONTIN) 400 mg capsule 2 Sig: Take 1 Cap by mouth three (3) times daily for 30 days. Class: Print Route: Oral  
  
Follow-up Instructions Return in about 3 months (around 2017). Introducing [facility] & HEALTH SERVICES!    
 [facility] introduces [employer] patient portal. Now you can access parts of your medical record, email your doctor's office, and request medication refills online. 1. In your internet browser, go to [email or web address] 2. Click on the First Time User? Click Here link in the Sign In box. You will see the New Member Sign Up page. 3. Enter your [employer] Access Code exactly as it appears below. You will not need to use this code after youve completed the sign-up process. If you do not sign up before the expiration date, you must request a new code. · [employer] Access Code: [email or web address] Expires: 4/4/2017 12:45 PM 
 
4. Enter the last four digits of your Social Security Number (xxxx) and Date of Birth (mm/dd/yyyy) as indicated and click Submit. You will be taken to the next sign-up page. 5. Create a [employer] ID. This will be your [employer] login ID and cannot be changed, so think of one that is secure and easy to remember. 6. Create a [employer] password. You can change your password at any time. 7. Enter your Password Reset Question and Answer. This can be used at a later time if you forget your password. 8. Enter your e-mail address. You will receive e-mail notification when new information is available in [address] 9. Click Sign Up. You can now view and download portions of your medical record. 10. Click the Download Summary menu link to download a portable copy of your medical information. If you have questions, please visit the Frequently Asked Questions section of the [employer] website. Remember, [employer] is NOT to be used for urgent needs. For medical emergencies, dial 911. Now available from your iPhone and Android! Please provide this summary of care documentation to your next provider. Your primary care clinician is listed as BERTO GONZALEZ. If you have any questions after today's visit, please call [phone].

## 2017-03-30 NOTE — PROGRESS NOTES
Nursing Notes    Patient presents to the office today in follow-up. Patient rates her pain at 7/10 on the numerical pain scale. Reviewed medications with counts as follows:    Rx Date filled Qty Dispensed Pill Count Last Dose Short   Fentanyl 100mcg 03/04/17 15 1 This am  No    Percocet 10/325mg  03/02/17 150 5 This am  No                                     Comments:     POC UDS was not performed in office today    Any new labs or imaging since last appointment? YES    Have you been to an emergency room (ER) or urgent care clinic since your last visit? YES ; emergency dept due to tooth pain          Have you been hospitalized since your last visit? NO     If yes, where, when, and reason for visit? Have you seen or consulted any other health care providers outside of the [address] [place] [address]  since your last visit? YES     If yes, where, when, and reason for visit? Dentist     HM deferred to pcp.

## 2017-04-20 ENCOUNTER — TELEPHONE (OUTPATIENT)
Dept: PAIN MANAGEMENT | Age: 47
End: 2017-04-20

## 2017-06-26 ENCOUNTER — OFFICE VISIT (OUTPATIENT)
Dept: PAIN MANAGEMENT | Age: 47
End: 2017-06-26

## 2017-06-26 VITALS
HEART RATE: 67 BPM | BODY MASS INDEX: 31.92 KG/M2 | DIASTOLIC BLOOD PRESSURE: 102 MMHG | HEIGHT: 64 IN | WEIGHT: 187 LBS | SYSTOLIC BLOOD PRESSURE: 174 MMHG

## 2017-06-26 DIAGNOSIS — M47.816 OSTEOARTHRITIS OF LUMBAR SPINE, UNSPECIFIED SPINAL OSTEOARTHRITIS COMPLICATION STATUS: ICD-10-CM

## 2017-06-26 DIAGNOSIS — M51.36 DDD (DEGENERATIVE DISC DISEASE), LUMBAR: ICD-10-CM

## 2017-06-26 DIAGNOSIS — M48.062 SPINAL STENOSIS, LUMBAR REGION, WITH NEUROGENIC CLAUDICATION: ICD-10-CM

## 2017-06-26 DIAGNOSIS — Z79.899 ENCOUNTER FOR LONG-TERM (CURRENT) USE OF HIGH-RISK MEDICATION: ICD-10-CM

## 2017-06-26 DIAGNOSIS — Z98.84 GASTRIC BYPASS STATUS FOR OBESITY: ICD-10-CM

## 2017-06-26 DIAGNOSIS — G89.4 CHRONIC PAIN SYNDROME: ICD-10-CM

## 2017-06-26 DIAGNOSIS — M54.41 CHRONIC BILATERAL LOW BACK PAIN WITH BILATERAL SCIATICA: Primary | ICD-10-CM

## 2017-06-26 DIAGNOSIS — G89.29 CHRONIC BILATERAL LOW BACK PAIN WITH BILATERAL SCIATICA: Primary | ICD-10-CM

## 2017-06-26 DIAGNOSIS — M54.42 CHRONIC BILATERAL LOW BACK PAIN WITH BILATERAL SCIATICA: Primary | ICD-10-CM

## 2017-06-26 LAB
ALCOHOL UR POC: NORMAL
AMPHETAMINES UR POC: NORMAL
BARBITURATES UR POC: NORMAL
BENZODIAZEPINES UR POC: NORMAL
BUPRENORPHINE UR POC: NORMAL
CANNABINOIDS UR POC: NORMAL
CARISOPRODOL UR POC: NORMAL
COCAINE UR POC: NORMAL
FENTANYL UR POC: NORMAL
MDMA/ECSTASY UR POC: NORMAL
METHADONE UR POC: NORMAL
METHAMPHETAMINE UR POC: NORMAL
METHYLPHENIDATE UR POC: NORMAL
OPIATES UR POC: NORMAL
OXYCODONE UR POC: NORMAL
PHENCYCLIDINE UR POC: NORMAL
PROPOXYPHENE UR POC: NORMAL
TRAMADOL UR POC: NORMAL
TRICYCLICS UR POC: NORMAL

## 2017-06-26 RX ORDER — OXYCODONE AND ACETAMINOPHEN 10; 325 MG/1; MG/1
1 TABLET ORAL
Qty: 150 TAB | Refills: 0 | Status: SHIPPED | OUTPATIENT
Start: 2017-07-25 | End: 2017-08-24 | Stop reason: SDUPTHER

## 2017-06-26 RX ORDER — OXYCODONE AND ACETAMINOPHEN 10; 325 MG/1; MG/1
1 TABLET ORAL
Qty: 150 TAB | Refills: 0 | Status: SHIPPED | OUTPATIENT
Start: 2017-06-26 | End: 2017-08-24 | Stop reason: SDUPTHER

## 2017-06-26 RX ORDER — FENTANYL 100 UG/H
1 PATCH TRANSDERMAL
Qty: 15 PATCH | Refills: 0 | Status: SHIPPED | OUTPATIENT
Start: 2017-06-26 | End: 2017-08-24 | Stop reason: SDUPTHER

## 2017-06-26 RX ORDER — GABAPENTIN 400 MG/1
400 CAPSULE ORAL 3 TIMES DAILY
Qty: 90 CAP | Refills: 2 | Status: SHIPPED | OUTPATIENT
Start: 2017-06-26 | End: 2017-08-24 | Stop reason: SDUPTHER

## 2017-06-26 RX ORDER — BACLOFEN 10 MG/1
10 TABLET ORAL
Qty: 90 TAB | Refills: 1 | Status: SHIPPED | OUTPATIENT
Start: 2017-06-26 | End: 2018-02-13 | Stop reason: SDUPTHER

## 2017-06-26 RX ORDER — FENTANYL 100 UG/H
1 PATCH TRANSDERMAL
Qty: 15 PATCH | Refills: 0 | Status: SHIPPED | OUTPATIENT
Start: 2017-07-25 | End: 2017-08-24 | Stop reason: SDUPTHER

## 2017-06-26 NOTE — MR AVS SNAPSHOT
Visit Information Date & Time Provider Department Dept. Phone Encounter #  
 6/26/2017  8:00 AM Chiki Bailey MD [facility] [phone] Follow-up Instructions Return in about 2 months (around 8/26/2017). Follow-up and Disposition History Upcoming Health Maintenance Date Due DTaP/Tdap/Td series (1 - Tdap) 5/31/1989 PAP AKA CERVICAL CYTOLOGY 5/31/1989 INFLUENZA AGE 9 TO ADULT 8/1/2017 Allergies as of 6/26/2017  Review Complete On: 6/26/2017 By: Chiki Bailey MD  
  
 Severity Noted Reaction Type Reactions Latex  10/31/2011    Rash Ibuprofen  06/26/2014    Nausea and Vomiting Current Immunizations  Never Reviewed No immunizations on file. Not reviewed this visit You Were Diagnosed With   
  
 Codes Comments Chronic bilateral low back pain with bilateral sciatica    -  Primary ICD-10-CM: M54.42, M54.41, G89.29 ICD-9-CM: 724.2, 724.3, 338.29 Spinal stenosis, lumbar region, with neurogenic claudication     ICD-10-CM: M48.06 
ICD-9-CM: 724.03 Osteoarthritis of lumbar spine, unspecified spinal osteoarthritis complication status     BRIANNA-75-GW: M47.816 ICD-9-CM: 721.3 DDD (degenerative disc disease), lumbar     ICD-10-CM: M51.36 
ICD-9-CM: 722.52 Chronic pain syndrome     ICD-10-CM: G89.4 ICD-9-CM: 338.4 Gastric bypass status for obesity     ICD-10-CM: Z98.84 ICD-9-CM: V45.86 Vitals BP Pulse Height(growth percentile) Weight(growth percentile) BMI OB Status (!) 174/102 67 5' 4\" (1.626 m) 187 lb (84.8 kg) 32.1 kg/m2 Hysterectomy Smoking Status Never Smoker Vitals History BMI and BSA Data Body Mass Index Body Surface Area  
 32.1 kg/m 2 1.96 m 2 Preferred Pharmacy Pharmacy Name Phone [address] [facility], [address] [place] Your Updated Medication List  
  
   
 This list is accurate as of: 17  8:24 AM.  Always use your most recent med list.  
  
  
  
  
 Back Brace Misc  
1 Units by Does Not Apply route daily. As directed  
  
 baclofen 10 mg tablet Commonly known as:  LIORESAL Take 1 Tab by mouth three (3) times daily as needed. * fentaNYL 100 mcg/hr PATCH Commonly known as:  DURAGESIC  
1 Patch by TransDERmal route every fourty-eight (48) hours for 30 days. For chronic pain. Mylan, Sandoz, or Mallinckrodt brands only. * fentaNYL 100 mcg/hr PATCH Commonly known as:  DURAGESIC  
1 Patch by TransDERmal route every fourty-eight (48) hours for 30 days. For chronic pain. For chronic pain. Mylan, Sandoz, or Mallinckrodt brands only. Start taking on:  2017  
  
 gabapentin 400 mg capsule Commonly known as:  NEURONTIN Take 1 Cap by mouth three (3) times daily for 30 days. naloxone 4 mg/actuation Spry 4 mg by Nasal route as needed. * oxyCODONE-acetaminophen  mg per tablet Commonly known as:  PERCOCET 10 Take 1 Tab by mouth five (5) times daily for 30 days. Max Daily Amount: 5 Tabs. * oxyCODONE-acetaminophen  mg per tablet Commonly known as:  PERCOCET 10 Take 1 Tab by mouth five (5) times daily for 30 days. Max Daily Amount: 5 Tabs. Start taking on:  2017 ZOLOFT 50 mg tablet Generic drug:  sertraline Take 50 mg by mouth daily. * Notice: This list has 4 medication(s) that are the same as other medications prescribed for you. Read the directions carefully, and ask your doctor or other care provider to review them with you. Prescriptions Printed Refills  
 fentaNYL (DURAGESIC) 100 mcg/hr PATCH 0 Si Patch by TransDERmal route every fourty-eight (48) hours for 30 days. For chronic pain. Mylan, Sandoz, or Mallinckrodt brands only. Class: Print  Route: TransDERmal  
 oxyCODONE-acetaminophen (PERCOCET 10)  mg per tablet 0  
 Sig: Take 1 Tab by mouth five (5) times daily for 30 days. Max Daily Amount: 5 Tabs. Class: Print Route: Oral  
 fentaNYL (DURAGESIC) 100 mcg/hr PATCH 0 Starting on: 2017 Si Patch by TransDERmal route every fourty-eight (48) hours for 30 days. For chronic pain. For chronic pain. Mylan, Sandoz, or Mallinckrodt brands only. Class: Print Route: TransDERmal  
 oxyCODONE-acetaminophen (PERCOCET 10)  mg per tablet 0 Starting on: 2017 Sig: Take 1 Tab by mouth five (5) times daily for 30 days. Max Daily Amount: 5 Tabs. Class: Print Route: Oral  
 baclofen (LIORESAL) 10 mg tablet 1 Sig: Take 1 Tab by mouth three (3) times daily as needed. Class: Print Route: Oral  
  
Prescriptions Sent to Pharmacy Refills  
 gabapentin (NEURONTIN) 400 mg capsule 2 Sig: Take 1 Cap by mouth three (3) times daily for 30 days. Class: Normal  
 Pharmacy: [id number] [facility], [address] [facility] Ph #: [phone] Route: Oral  
  
Follow-up Instructions Return in about 2 months (around 2017). Introducing [facility] & HEALTH SERVICES! Destiny Mandel introduces [employer] patient portal. Now you can access parts of your medical record, email your doctor's office, and request medication refills online. 1. In your internet browser, go to [email or web address] 2. Click on the First Time User? Click Here link in the Sign In box. You will see the New Member Sign Up page. 3. Enter your [employer] Access Code exactly as it appears below. You will not need to use this code after youve completed the sign-up process. If you do not sign up before the expiration date, you must request a new code. · [employer] Access Code: [email or web address] Expires: 2017  8:24 AM 
 
4. Enter the last four digits of your Social Security Number (xxxx) and Date of Birth (mm/dd/yyyy) as indicated and click Submit. You will be taken to the next sign-up page. 5. Create a [employer] ID. This will be your [employer] login ID and cannot be changed, so think of one that is secure and easy to remember. 6. Create a [employer] password. You can change your password at any time. 7. Enter your Password Reset Question and Answer. This can be used at a later time if you forget your password. 8. Enter your e-mail address. You will receive e-mail notification when new information is available in [address] 9. Click Sign Up. You can now view and download portions of your medical record. 10. Click the Download Summary menu link to download a portable copy of your medical information. If you have questions, please visit the Frequently Asked Questions section of the [employer] website. Remember, [employer] is NOT to be used for urgent needs. For medical emergencies, dial 911. Now available from your iPhone and Android! Please provide this summary of care documentation to your next provider. Your primary care clinician is listed as BERTO GONZALEZ. If you have any questions after today's visit, please call [phone].

## 2017-06-26 NOTE — PROGRESS NOTES
Nursing Notes    Patient presents to the office today in follow-up. Patient rates her pain at 7/10 on the numerical pain scale. Reviewed medications with counts as follows:    Rx Date filled Qty Dispensed Pill Count Last Dose Short   Fentanyl 100 mcg/hr  06/03/17 15 2+1 on  Current patch on left arm no   Percocet 10/325 mg - pt states the pharmacy filled her medication 2 days early due to travel.  05/22/17 150 3 This a.m no                            POC UDS was performed in office today    Any new labs or imaging since last appointment? NO    Have you been to an emergency room (ER) or urgent care clinic since your last visit? NO            Have you been hospitalized since your last visit? NO     If yes, where, when, and reason for visit? Have you seen or consulted any other health care providers outside of the [address] [place] [address]  since your last visit? YES     If yes, where, when, and reason for visit? pcp for bp    HM deferred to pcp.

## 2017-06-26 NOTE — PROGRESS NOTES
HISTORY OF PRESENT ILLNESS  David Simon is a 50 y.o. female. HPI Comments: Meds help with pain control and quality of life. No new side effects reported today. Visit survey reviewed and will be scanned.  reviewed. Recent average level of pain(out of 10)-6  Chief complaint low back pain  Chronic pain syndrome  Using fentanyl patch 100 mcg every 2 day  Percocet 10 mg, up to 5 a day as needed  Gabapentin 400 mg 3 times a day  Previously she was using Skelaxin. She reports this helped to loosen up tight back muscles. However she reports insurance coverage changed and is now too expensive for her. She would like to try a different muscle relaxer. I have prescribed baclofen 10 mg 3 times a day as needed  She recently started having headaches and is working with her primary care physician on this. She does receive some of her medications from psychiatry, including Adderall, Zoloft, Topamax      Measuring clinical outcomes of chronic pain patients. This was reviewed today. The survey will be scanned. Please see the survey for details. Total score-7      Review of Systems   Constitutional: Negative for chills and fever. HENT: Negative for ear discharge and ear pain. Eyes: Negative for pain and discharge. Respiratory: Negative for hemoptysis and shortness of breath. Cardiovascular: Negative for chest pain and leg swelling. Gastrointestinal: Negative for blood in stool and melena. Musculoskeletal: Positive for back pain and myalgias. Negative for falls. Skin: Negative for itching and rash. Neurological: Negative for dizziness and seizures. Psychiatric/Behavioral: Negative for hallucinations and suicidal ideas. Physical Exam   Constitutional: She appears well-developed and well-nourished. She is cooperative. She does not have a sickly appearance. HENT:   Head: Normocephalic and atraumatic. Right Ear: External ear normal. No drainage.    Left Ear: External ear normal. No drainage. Nose: Nose normal.   Eyes: Lids are normal. Right eye exhibits no discharge. Left eye exhibits no discharge. Right conjunctiva has no hemorrhage. Left conjunctiva has no hemorrhage. Neck: Neck supple. No tracheal deviation present. No thyroid mass present. Pulmonary/Chest: Effort normal. No respiratory distress. Neurological: She is alert. No cranial nerve deficit. Skin: Skin is intact. No rash noted. Psychiatric: Her speech is normal. Her affect is not angry. She does not express inappropriate judgment. Nursing note and vitals reviewed. ASSESSMENT and PLAN  Encounter Diagnoses   Name Primary?  Chronic bilateral low back pain with bilateral sciatica Yes    Spinal stenosis, lumbar region, with neurogenic claudication     Osteoarthritis of lumbar spine, unspecified spinal osteoarthritis complication status     DDD (degenerative disc disease), lumbar     Chronic pain syndrome     Gastric bypass status for obesity    --Urine test or oral swab today. Also, the prescription monitoring program was reviewed today. The majority of today's visit was spent counseling and coordinating care. Total visit time-40 minutes. -Dragon medical dictation software was used for portions of this report. Unintended errors may occur. No indicators for recent medication misuse.  reviewed. Pain Meds and Quality Of Life have been reviewed. Nonpharmacologic therapy and non-opioid pharmacologic therapy were considered. If opioid therapy is prescribed, this is only if the expected benefits are anticipated to outweigh risks. Possible changes to treatment plan considered. Support/education given as needed. Today-medications are as listed. changes to medications. Follow up -- 2 months.

## 2017-07-03 ENCOUNTER — DOCUMENTATION ONLY (OUTPATIENT)
Dept: PAIN MANAGEMENT | Age: 47
End: 2017-07-03

## 2017-07-03 NOTE — PROGRESS NOTES
Pt assistance forms completed by Dr. Rosa Isela Woodson faxed to [facility] for her fentanyl. Pt relayed she has already faxed her portion with scripts to them and this was relayed on fax cover sheet to company. Have saved forms in case pt needs.

## 2017-07-07 ENCOUNTER — DOCUMENTATION ONLY (OUTPATIENT)
Dept: PAIN MANAGEMENT | Age: 47
End: 2017-07-07

## 2017-07-07 ENCOUNTER — TELEPHONE (OUTPATIENT)
Dept: PAIN MANAGEMENT | Age: 47
End: 2017-07-07

## 2017-07-07 NOTE — TELEPHONE ENCOUNTER
received documentation from [employer] that more information is needed to process assistance request. Have called pt and she relayed that information had also been sent to [facility] for provider and they faxed back that provider no longer with their group. She relayed they straightened everything out and she was advised it/ assistance would be approved.

## 2017-07-10 ENCOUNTER — TELEPHONE (OUTPATIENT)
Dept: PAIN MANAGEMENT | Age: 47
End: 2017-07-10

## 2017-07-10 NOTE — TELEPHONE ENCOUNTER
[facility] - said we will receive a response by 7/17

## 2017-08-24 ENCOUNTER — OFFICE VISIT (OUTPATIENT)
Dept: PAIN MANAGEMENT | Age: 47
End: 2017-08-24

## 2017-08-24 VITALS
BODY MASS INDEX: 26.29 KG/M2 | SYSTOLIC BLOOD PRESSURE: 138 MMHG | HEIGHT: 64 IN | HEART RATE: 61 BPM | WEIGHT: 154 LBS | DIASTOLIC BLOOD PRESSURE: 76 MMHG | TEMPERATURE: 97.3 F | RESPIRATION RATE: 12 BRPM

## 2017-08-24 DIAGNOSIS — Z98.84 GASTRIC BYPASS STATUS FOR OBESITY: ICD-10-CM

## 2017-08-24 DIAGNOSIS — G89.4 CHRONIC PAIN SYNDROME: ICD-10-CM

## 2017-08-24 DIAGNOSIS — M47.816 OSTEOARTHRITIS OF LUMBAR SPINE, UNSPECIFIED SPINAL OSTEOARTHRITIS COMPLICATION STATUS: ICD-10-CM

## 2017-08-24 DIAGNOSIS — M19.90 OSTEOARTHRITIS, UNSPECIFIED OSTEOARTHRITIS TYPE, UNSPECIFIED SITE: ICD-10-CM

## 2017-08-24 DIAGNOSIS — M48.062 SPINAL STENOSIS, LUMBAR REGION, WITH NEUROGENIC CLAUDICATION: ICD-10-CM

## 2017-08-24 DIAGNOSIS — M54.41 CHRONIC BILATERAL LOW BACK PAIN WITH BILATERAL SCIATICA: Primary | ICD-10-CM

## 2017-08-24 DIAGNOSIS — M51.36 DDD (DEGENERATIVE DISC DISEASE), LUMBAR: ICD-10-CM

## 2017-08-24 DIAGNOSIS — G89.29 CHRONIC BILATERAL LOW BACK PAIN WITH BILATERAL SCIATICA: Primary | ICD-10-CM

## 2017-08-24 DIAGNOSIS — M54.42 CHRONIC BILATERAL LOW BACK PAIN WITH BILATERAL SCIATICA: Primary | ICD-10-CM

## 2017-08-24 RX ORDER — OXYCODONE AND ACETAMINOPHEN 10; 325 MG/1; MG/1
1 TABLET ORAL
Qty: 150 TAB | Refills: 0 | Status: SHIPPED | OUTPATIENT
Start: 2017-10-22 | End: 2017-11-20 | Stop reason: SDUPTHER

## 2017-08-24 RX ORDER — OXYCODONE AND ACETAMINOPHEN 10; 325 MG/1; MG/1
1 TABLET ORAL
Qty: 150 TAB | Refills: 0 | Status: SHIPPED | OUTPATIENT
Start: 2017-08-24 | End: 2017-08-24

## 2017-08-24 RX ORDER — FENTANYL 100 UG/H
1 PATCH TRANSDERMAL
Qty: 15 PATCH | Refills: 0 | Status: SHIPPED | OUTPATIENT
Start: 2017-10-22 | End: 2017-11-20 | Stop reason: SDUPTHER

## 2017-08-24 RX ORDER — FENTANYL 100 UG/H
1 PATCH TRANSDERMAL
Qty: 15 PATCH | Refills: 0 | Status: SHIPPED | OUTPATIENT
Start: 2017-09-23 | End: 2017-11-20 | Stop reason: SDUPTHER

## 2017-08-24 RX ORDER — GABAPENTIN 400 MG/1
400 CAPSULE ORAL 3 TIMES DAILY
Qty: 90 CAP | Refills: 2 | Status: SHIPPED | OUTPATIENT
Start: 2017-08-24 | End: 2017-11-20 | Stop reason: SDUPTHER

## 2017-08-24 RX ORDER — FENTANYL 100 UG/H
1 PATCH TRANSDERMAL
Qty: 15 PATCH | Refills: 0 | Status: SHIPPED | OUTPATIENT
Start: 2017-08-24 | End: 2017-08-24

## 2017-08-24 RX ORDER — OXYCODONE AND ACETAMINOPHEN 10; 325 MG/1; MG/1
1 TABLET ORAL
Qty: 150 TAB | Refills: 0 | Status: SHIPPED | OUTPATIENT
Start: 2017-09-23 | End: 2017-11-20 | Stop reason: SDUPTHER

## 2017-08-24 NOTE — PROGRESS NOTES
HISTORY OF PRESENT ILLNESS  Lenin Mares is a 50 y.o. female. HPI Comments: Meds help with pain control and quality of life. No new side effects reported today. Visit survey reviewed and will be scanned.  reviewed. Recent average level of pain(out of 10)-6  Chief complaint low back pain, symptoms in the legs  Chronic pain syndrome  Using gabapentin 400 mg  Infrequent, as needed use of baclofen  Fentanyl patch 100 mcg every 48 hour  Percocet 10 mg 5 times a day as needed  70% complete relief in the past 30 days  She did report today, recent painful dental work. She actually required a visit to the emergency room because of infection. Measuring clinical outcomes of chronic pain patients. This was reviewed today. The survey will be scanned. Please see the survey for details. Total score5      Review of Systems   Constitutional: Negative for chills and fever. HENT: Negative for ear discharge and ear pain. Eyes: Negative for pain and discharge. Respiratory: Negative for hemoptysis and shortness of breath. Cardiovascular: Negative for chest pain and leg swelling. Gastrointestinal: Negative for blood in stool and melena. Musculoskeletal: Positive for back pain and myalgias. Negative for falls. Skin: Negative for itching and rash. Neurological: Negative for dizziness and seizures. Psychiatric/Behavioral: Negative for hallucinations and suicidal ideas. Physical Exam   Constitutional: She appears well-developed and well-nourished. She is cooperative. She does not have a sickly appearance. HENT:   Head: Normocephalic and atraumatic. Right Ear: External ear normal. No drainage. Left Ear: External ear normal. No drainage. Nose: Nose normal.   Eyes: Lids are normal. Right eye exhibits no discharge. Left eye exhibits no discharge. Right conjunctiva has no hemorrhage. Left conjunctiva has no hemorrhage. Neck: Neck supple. No tracheal deviation present.  No thyroid mass present. Pulmonary/Chest: Effort normal. No respiratory distress. Neurological: She is alert. No cranial nerve deficit. Skin: Skin is intact. No rash noted. Psychiatric: Her speech is normal. Her affect is not angry. She does not express inappropriate judgment. Nursing note and vitals reviewed. ASSESSMENT and PLAN  Encounter Diagnoses   Name Primary?  Chronic bilateral low back pain with bilateral sciatica Yes    Spinal stenosis, lumbar region, with neurogenic claudication     Osteoarthritis of lumbar spine, unspecified spinal osteoarthritis complication status     DDD (degenerative disc disease), lumbar     Chronic pain syndrome     Osteoarthritis, unspecified osteoarthritis type, unspecified site     Gastric bypass status for obesity    No indicators for recent medication misuse.  reviewed. Pain Meds and Quality Of Life have been reviewed. Nonpharmacologic therapy and non-opioid pharmacologic therapy were considered. If opioid therapy is prescribed, this is only if the expected benefits are anticipated to outweigh risks. Possible changes to treatment plan considered. Support/education given as needed. Today-medications are as listed. No significant changes to medications. Follow up -- 3 months. She prefers a 3 month follow-up. She and I discussed this before.

## 2017-08-24 NOTE — PROGRESS NOTES
Nursing Notes    Patient presents to the office today in follow-up. Patient rates her pain at 8/10 on the numerical pain scale. Reviewed medications with counts as follows:    Rx Date filled Qty Dispensed Pill Count Last Dose Short   Oxycodone 10-325mg 07/25/17 150 0 today no   Fentanyl 100mcg  08/06/17 15 5+1 on today                        Comments:     POC UDS was not performed in office today    Any new labs or imaging since last appointment? NO    Have you been to an emergency room (ER) or urgent care clinic since your last visit? YES  Infection in teeth            Have you been hospitalized since your last visit? NO     If yes, where, when, and reason for visit? Have you seen or consulted any other health care providers outside of the [facility]  since your last visit? YES     If yes, where, when, and reason for visit? HM deferred to pcp.

## 2017-08-24 NOTE — MR AVS SNAPSHOT
Visit Information Date & Time Provider Department Dept. Phone Encounter #  
 8/24/2017  8:15 AM Amador Stafford MD [address] [place] [address] for Pain Management [phone] [id number] Follow-up Instructions Return in about 3 months (around 11/24/2017). Follow-up and Disposition History Upcoming Health Maintenance Date Due DTaP/Tdap/Td series (1 - Tdap) 5/31/1989 PAP AKA CERVICAL CYTOLOGY 5/31/1989 INFLUENZA AGE 9 TO ADULT 8/1/2017 Allergies as of 8/24/2017  Review Complete On: 8/24/2017 By: Amador Stafford MD  
  
 Severity Noted Reaction Type Reactions Latex  10/31/2011    Rash Ibuprofen  06/26/2014    Nausea and Vomiting Current Immunizations  Never Reviewed No immunizations on file. Not reviewed this visit You Were Diagnosed With   
  
 Codes Comments Chronic bilateral low back pain with bilateral sciatica    -  Primary ICD-10-CM: M54.42, M54.41, G89.29 ICD-9-CM: 724.2, 724.3, 338.29 Spinal stenosis, lumbar region, with neurogenic claudication     ICD-10-CM: M48.06 
ICD-9-CM: 724.03 Osteoarthritis of lumbar spine, unspecified spinal osteoarthritis complication status     BQY-82-ZW: M47.816 ICD-9-CM: 721.3 DDD (degenerative disc disease), lumbar     ICD-10-CM: M51.36 
ICD-9-CM: 722.52 Chronic pain syndrome     ICD-10-CM: G89.4 ICD-9-CM: 338.4 Osteoarthritis, unspecified osteoarthritis type, unspecified site     ICD-10-CM: M19.90 ICD-9-CM: 715.90 Gastric bypass status for obesity     ICD-10-CM: Z98.84 ICD-9-CM: V45.86 Vitals BP Pulse Temp Resp Height(growth percentile) Weight(growth percentile) 138/76 61 97.3 °F (36.3 °C) 12 5' 4\" (1.626 m) 154 lb (69.9 kg) BMI OB Status Smoking Status 26.43 kg/m2 Hysterectomy Never Smoker BMI and BSA Data Body Mass Index Body Surface Area  
 26.43 kg/m 2 1.78 m 2 Preferred Pharmacy Pharmacy Name Phone [facility], [address] Your Updated Medication List  
  
   
This list is accurate as of: 8/24/17  8:39 AM.  Always use your most recent med list.  
  
  
  
  
 Back Brace Misc  
1 Units by Does Not Apply route daily. As directed  
  
 baclofen 10 mg tablet Commonly known as:  LIORESAL Take 1 Tab by mouth three (3) times daily as needed. * fentaNYL 100 mcg/hr PATCH Commonly known as:  DURAGESIC  
1 Patch by TransDERmal route every fourty-eight (48) hours for 30 days. For chronic pain. Mylan, Sandoz, or Mallinckrodt brands only. Start taking on:  9/23/2017 * fentaNYL 100 mcg/hr PATCH Commonly known as:  DURAGESIC  
1 Patch by TransDERmal route every fourty-eight (48) hours for 30 days. For chronic pain. For chronic pain. Mylan, Sandoz, or Mallinckrodt brands only. Start taking on:  10/22/2017  
  
 gabapentin 400 mg capsule Commonly known as:  NEURONTIN Take 1 Cap by mouth three (3) times daily for 30 days. naloxone 4 mg/actuation Spry 4 mg by Nasal route as needed. * oxyCODONE-acetaminophen  mg per tablet Commonly known as:  PERCOCET 10 Take 1 Tab by mouth five (5) times daily for 30 days. Max Daily Amount: 5 Tabs. Start taking on:  9/23/2017 * oxyCODONE-acetaminophen  mg per tablet Commonly known as:  PERCOCET 10 Take 1 Tab by mouth five (5) times daily for 30 days. Max Daily Amount: 5 Tabs. Start taking on:  10/22/2017 ZOLOFT 50 mg tablet Generic drug:  sertraline Take 50 mg by mouth daily. * Notice: This list has 4 medication(s) that are the same as other medications prescribed for you. Read the directions carefully, and ask your doctor or other care provider to review them with you. Prescriptions Printed Refills  
 fentaNYL (DURAGESIC) 100 mcg/hr PATCH 0 Starting on: 9/23/2017 Si Patch by TransDERmal route every fourty-eight (48) hours for 30 days. For chronic pain. Mylan, Sandoz, or Mallinckrodt brands only. Class: Print Route: TransDERmal  
 oxyCODONE-acetaminophen (PERCOCET 10)  mg per tablet 0 Starting on: 2017 Sig: Take 1 Tab by mouth five (5) times daily for 30 days. Max Daily Amount: 5 Tabs. Class: Print Route: Oral  
 fentaNYL (DURAGESIC) 100 mcg/hr PATCH 0 Starting on: 10/22/2017 Si Patch by TransDERmal route every fourty-eight (48) hours for 30 days. For chronic pain. For chronic pain. Mylan, Sandoz, or Mallinckrodt brands only. Class: Print Route: TransDERmal  
 oxyCODONE-acetaminophen (PERCOCET 10)  mg per tablet 0 Starting on: 10/22/2017 Sig: Take 1 Tab by mouth five (5) times daily for 30 days. Max Daily Amount: 5 Tabs. Class: Print Route: Oral  
  
Prescriptions Sent to Pharmacy Refills  
 gabapentin (NEURONTIN) 400 mg capsule 2 Sig: Take 1 Cap by mouth three (3) times daily for 30 days. Class: Normal  
 Pharmacy: [id number] [facility], [address] Ph #: [phone] Route: Oral  
  
Follow-up Instructions Return in about 3 months (around 2017). Introducing \[facility] [place] [facility]\"" SERVICES! Lubna Hinojosa introduces [employer] patient portal. Now you can access parts of your medical record, email your doctor's office, and request medication refills online. 1. In your internet browser, go to [email or web address] 2. Click on the First Time User? Click Here link in the Sign In box. You will see the New Member Sign Up page. 3. Enter your [employer] Access Code exactly as it appears below. You will not need to use this code after youve completed the sign-up process. If you do not sign up before the expiration date, you must request a new code. · [employer] Access Code: [email or web address] Expires: 2017  8:24 AM 
 
 4. Enter the last four digits of your Social Security Number (xxxx) and Date of Birth (mm/dd/yyyy) as indicated and click Submit. You will be taken to the next sign-up page. 5. Create a [employer] ID. This will be your [employer] login ID and cannot be changed, so think of one that is secure and easy to remember. 6. Create a [employer] password. You can change your password at any time. 7. Enter your Password Reset Question and Answer. This can be used at a later time if you forget your password. 8. Enter your e-mail address. You will receive e-mail notification when new information is available in [address] 9. Click Sign Up. You can now view and download portions of your medical record. 10. Click the Download Summary menu link to download a portable copy of your medical information. If you have questions, please visit the Frequently Asked Questions section of the [employer] website. Remember, [employer] is NOT to be used for urgent needs. For medical emergencies, dial 911. Now available from your iPhone and Android! Please provide this summary of care documentation to your next provider. Your primary care clinician is listed as Jamari Cutler. If you have any questions after today's visit, please call [phone].

## 2017-11-20 ENCOUNTER — OFFICE VISIT (OUTPATIENT)
Dept: PAIN MANAGEMENT | Age: 47
End: 2017-11-20

## 2017-11-20 VITALS
DIASTOLIC BLOOD PRESSURE: 83 MMHG | TEMPERATURE: 98 F | WEIGHT: 154 LBS | SYSTOLIC BLOOD PRESSURE: 134 MMHG | HEART RATE: 63 BPM | RESPIRATION RATE: 20 BRPM | BODY MASS INDEX: 26.43 KG/M2

## 2017-11-20 DIAGNOSIS — M54.42 CHRONIC BILATERAL LOW BACK PAIN WITH BILATERAL SCIATICA: Primary | ICD-10-CM

## 2017-11-20 DIAGNOSIS — M54.41 CHRONIC BILATERAL LOW BACK PAIN WITH BILATERAL SCIATICA: Primary | ICD-10-CM

## 2017-11-20 DIAGNOSIS — M19.90 OSTEOARTHRITIS, UNSPECIFIED OSTEOARTHRITIS TYPE, UNSPECIFIED SITE: ICD-10-CM

## 2017-11-20 DIAGNOSIS — M47.816 OSTEOARTHRITIS OF LUMBAR SPINE, UNSPECIFIED SPINAL OSTEOARTHRITIS COMPLICATION STATUS: ICD-10-CM

## 2017-11-20 DIAGNOSIS — G89.4 CHRONIC PAIN SYNDROME: ICD-10-CM

## 2017-11-20 DIAGNOSIS — M51.36 DDD (DEGENERATIVE DISC DISEASE), LUMBAR: ICD-10-CM

## 2017-11-20 DIAGNOSIS — G89.29 CHRONIC BILATERAL LOW BACK PAIN WITH BILATERAL SCIATICA: Primary | ICD-10-CM

## 2017-11-20 DIAGNOSIS — M48.062 SPINAL STENOSIS, LUMBAR REGION, WITH NEUROGENIC CLAUDICATION: ICD-10-CM

## 2017-11-20 DIAGNOSIS — Z79.899 ENCOUNTER FOR LONG-TERM (CURRENT) USE OF HIGH-RISK MEDICATION: ICD-10-CM

## 2017-11-20 LAB
ALCOHOL UR POC: NORMAL
AMPHETAMINES UR POC: NEGATIVE
BARBITURATES UR POC: NEGATIVE
BENZODIAZEPINES UR POC: NEGATIVE
BUPRENORPHINE UR POC: NORMAL
CANNABINOIDS UR POC: NEGATIVE
CARISOPRODOL UR POC: NORMAL
COCAINE UR POC: NEGATIVE
FENTANYL UR POC: NORMAL
MDMA/ECSTASY UR POC: NEGATIVE
METHADONE UR POC: NEGATIVE
METHAMPHETAMINE UR POC: NEGATIVE
METHYLPHENIDATE UR POC: NEGATIVE
OPIATES UR POC: NEGATIVE
OXYCODONE UR POC: NORMAL
PHENCYCLIDINE UR POC: NORMAL
PROPOXYPHENE UR POC: NORMAL
TRAMADOL UR POC: NORMAL
TRICYCLICS UR POC: NEGATIVE

## 2017-11-20 RX ORDER — FENTANYL 100 UG/H
1 PATCH TRANSDERMAL
Qty: 15 PATCH | Refills: 0 | Status: SHIPPED | OUTPATIENT
Start: 2018-01-18 | End: 2018-02-13 | Stop reason: SDUPTHER

## 2017-11-20 RX ORDER — OXYCODONE AND ACETAMINOPHEN 10; 325 MG/1; MG/1
1 TABLET ORAL
Qty: 150 TAB | Refills: 0 | Status: SHIPPED | OUTPATIENT
Start: 2017-12-19 | End: 2018-02-13 | Stop reason: SDUPTHER

## 2017-11-20 RX ORDER — FENTANYL 100 UG/H
1 PATCH TRANSDERMAL
Qty: 15 PATCH | Refills: 0 | Status: SHIPPED | OUTPATIENT
Start: 2017-11-20 | End: 2018-02-13 | Stop reason: SDUPTHER

## 2017-11-20 RX ORDER — FENTANYL 100 UG/H
1 PATCH TRANSDERMAL
Qty: 15 PATCH | Refills: 0 | Status: SHIPPED | OUTPATIENT
Start: 2017-12-19 | End: 2018-02-13 | Stop reason: SDUPTHER

## 2017-11-20 RX ORDER — OXYCODONE AND ACETAMINOPHEN 10; 325 MG/1; MG/1
1 TABLET ORAL
Qty: 150 TAB | Refills: 0 | Status: SHIPPED | OUTPATIENT
Start: 2018-01-18 | End: 2018-02-13 | Stop reason: SDUPTHER

## 2017-11-20 RX ORDER — OXYCODONE AND ACETAMINOPHEN 10; 325 MG/1; MG/1
1 TABLET ORAL
Qty: 150 TAB | Refills: 0 | Status: SHIPPED | OUTPATIENT
Start: 2017-11-20 | End: 2018-02-13 | Stop reason: SDUPTHER

## 2017-11-20 RX ORDER — GABAPENTIN 400 MG/1
400 CAPSULE ORAL 3 TIMES DAILY
Qty: 90 CAP | Refills: 2 | Status: SHIPPED | OUTPATIENT
Start: 2017-11-20 | End: 2018-02-13 | Stop reason: SDUPTHER

## 2017-11-20 NOTE — MR AVS SNAPSHOT
Visit Information Date & Time Provider Department Dept. Phone Encounter #  
 11/20/2017  8:00 AM Gibran Resendez MD [address] [place] [address] for Pain Management [phone] Follow-up Instructions Return in about 3 months (around 2/20/2018). Follow-up and Disposition History Upcoming Health Maintenance Date Due DTaP/Tdap/Td series (1 - Tdap) 5/31/1989 PAP AKA CERVICAL CYTOLOGY 5/31/1989 Influenza Age 5 to Adult 8/1/2017 Allergies as of 11/20/2017  Review Complete On: 11/20/2017 By: Gibran Resendez MD  
  
 Severity Noted Reaction Type Reactions Latex  10/31/2011    Rash Ibuprofen  06/26/2014    Nausea and Vomiting Current Immunizations  Never Reviewed No immunizations on file. Not reviewed this visit You Were Diagnosed With   
  
 Codes Comments Chronic bilateral low back pain with bilateral sciatica    -  Primary ICD-10-CM: M54.42, M54.41, G89.29 ICD-9-CM: 724.2, 724.3, 338.29 Encounter for long-term (current) use of high-risk medication     ICD-10-CM: Z79.899 ICD-9-CM: V58.69 Chronic pain syndrome     ICD-10-CM: G89.4 ICD-9-CM: 338.4 Osteoarthritis, unspecified osteoarthritis type, unspecified site     ICD-10-CM: M19.90 ICD-9-CM: 715.90 Osteoarthritis of lumbar spine, unspecified spinal osteoarthritis complication status     FSX-20-WN: M47.816 ICD-9-CM: 721.3 DDD (degenerative disc disease), lumbar     ICD-10-CM: M51.36 
ICD-9-CM: 722.52 Spinal stenosis, lumbar region, with neurogenic claudication     ICD-10-CM: M48.062 
ICD-9-CM: 724.03 Vitals BP Pulse Temp Resp Weight(growth percentile) BMI  
 134/83 63 98 °F (36.7 °C) 20 154 lb (69.9 kg) 26.43 kg/m2 OB Status Smoking Status Hysterectomy Never Smoker Vitals History BMI and BSA Data Body Mass Index Body Surface Area  
 26.43 kg/m 2 1.78 m 2 Preferred Pharmacy Pharmacy Name Phone [facility], [address] Your Updated Medication List  
  
   
This list is accurate as of: 11/20/17  9:01 AM.  Always use your most recent med list.  
  
  
  
  
 Back Brace Misc  
1 Units by Does Not Apply route daily. As directed  
  
 baclofen 10 mg tablet Commonly known as:  LIORESAL Take 1 Tab by mouth three (3) times daily as needed. * fentaNYL 100 mcg/hr PATCH Commonly known as:  DURAGESIC  
1 Patch by TransDERmal route every fourty-eight (48) hours for 30 days. For chronic pain. For chronic pain. Mylan, Sandoz, or Mallinckrodt brands only. * fentaNYL 100 mcg/hr PATCH Commonly known as:  DURAGESIC  
1 Patch by TransDERmal route every fourty-eight (48) hours for 30 days. For chronic pain. For chronic pain. Mylan, Sandoz, or Mallinckrodt brands only. Start taking on:  12/19/2017 * fentaNYL 100 mcg/hr PATCH Commonly known as:  DURAGESIC  
1 Patch by TransDERmal route every fourty-eight (48) hours for 30 days. For chronic pain. Mylan, Sandoz, or Mallinckrodt brands only. Start taking on:  1/18/2018  
  
 gabapentin 400 mg capsule Commonly known as:  NEURONTIN Take 1 Cap by mouth three (3) times daily for 30 days. naloxone 4 mg/actuation nasal spray Commonly known as:  NARCAN  
4 mg by Nasal route as needed. * oxyCODONE-acetaminophen  mg per tablet Commonly known as:  PERCOCET 10 Take 1 Tab by mouth five (5) times daily for 30 days. Max Daily Amount: 5 Tabs. * oxyCODONE-acetaminophen  mg per tablet Commonly known as:  PERCOCET 10 Take 1 Tab by mouth five (5) times daily for 30 days. Max Daily Amount: 5 Tabs. Start taking on:  12/19/2017 * oxyCODONE-acetaminophen  mg per tablet Commonly known as:  PERCOCET 10 Take 1 Tab by mouth five (5) times daily for 30 days. Max Daily Amount: 5 Tabs. Start taking on:  1/18/2018 ZOLOFT 50 mg tablet Generic drug:  sertraline Take 50 mg by mouth daily. * Notice: This list has 6 medication(s) that are the same as other medications prescribed for you. Read the directions carefully, and ask your doctor or other care provider to review them with you. Prescriptions Printed Refills  
 fentaNYL (DURAGESIC) 100 mcg/hr PATCH 0 Si Patch by TransDERmal route every fourty-eight (48) hours for 30 days. For chronic pain. For chronic pain. Mylan, Sandoz, or Mallinckrodt brands only. Class: Print Route: TransDERmal  
 oxyCODONE-acetaminophen (PERCOCET 10)  mg per tablet 0 Sig: Take 1 Tab by mouth five (5) times daily for 30 days. Max Daily Amount: 5 Tabs. Class: Print Route: Oral  
 fentaNYL (DURAGESIC) 100 mcg/hr PATCH 0 Starting on: 2018 Si Patch by TransDERmal route every fourty-eight (48) hours for 30 days. For chronic pain. Mylan, Sandoz, or Mallinckrodt brands only. Class: Print Route: TransDERmal  
 oxyCODONE-acetaminophen (PERCOCET 10)  mg per tablet 0 Starting on: 2018 Sig: Take 1 Tab by mouth five (5) times daily for 30 days. Max Daily Amount: 5 Tabs. Class: Print Route: Oral  
 fentaNYL (DURAGESIC) 100 mcg/hr PATCH 0 Starting on: 2017 Si Patch by TransDERmal route every fourty-eight (48) hours for 30 days. For chronic pain. For chronic pain. Mylan, Sandoz, or Mallinckrodt brands only. Class: Print Route: TransDERmal  
 oxyCODONE-acetaminophen (PERCOCET 10)  mg per tablet 0 Starting on: 2017 Sig: Take 1 Tab by mouth five (5) times daily for 30 days. Max Daily Amount: 5 Tabs. Class: Print Route: Oral  
  
Prescriptions Sent to Pharmacy Refills  
 gabapentin (NEURONTIN) 400 mg capsule 2 Sig: Take 1 Cap by mouth three (3) times daily for 30 days. Class: Normal  
 Pharmacy: [id number] [facility], [address] [facility] Ph #: [phone] Route: Oral  
  
We Performed the Following AMB POC DRUG SCREEN () [ [facility]] DRUG SCREEN [[id number] Custom] Follow-up Instructions Return in about 3 months (around 2/20/2018). Introducing [facility] [place] [facility] SERVICES! Jessica Raya introduces [employer] patient portal. Now you can access parts of your medical record, email your doctor's office, and request medication refills online. 1. In your internet browser, go to [email or web address] 2. Click on the First Time User? Click Here link in the Sign In box. You will see the New Member Sign Up page. 3. Enter your [employer] Access Code exactly as it appears below. You will not need to use this code after youve completed the sign-up process. If you do not sign up before the expiration date, you must request a new code. · [employer] Access Code: [email or web address] Expires: 2/18/2018  9:01 AM 
 
4. Enter the last four digits of your Social Security Number (xxxx) and Date of Birth (mm/dd/yyyy) as indicated and click Submit. You will be taken to the next sign-up page. 5. Create a [employer] ID. This will be your [employer] login ID and cannot be changed, so think of one that is secure and easy to remember. 6. Create a [employer] password. You can change your password at any time. 7. Enter your Password Reset Question and Answer. This can be used at a later time if you forget your password. 8. Enter your e-mail address. You will receive e-mail notification when new information is available in [address] 9. Click Sign Up. You can now view and download portions of your medical record. 10. Click the Download Summary menu link to download a portable copy of your medical information. If you have questions, please visit the Frequently Asked Questions section of the [employer] website. Remember, [employer] is NOT to be used for urgent needs. For medical emergencies, dial 911. Now available from your iPhone and Android! Please provide this summary of care documentation to your next provider. Your primary care clinician is listed as Reese Hadley. If you have any questions after today's visit, please call [phone].

## 2017-11-20 NOTE — PROGRESS NOTES
Nursing Notes    Patient presents to the office today in follow-up. Patient rates her pain at 4/10 on the numerical pain scale. Reviewed medications with counts as follows:    Rx Date filled Qty Dispensed Pill Count Last Dose Short     Percocet 10/325mg 10/20/17 150 0 This am  No    Fentanyl 100mcg 11/04/17 15 6 11/20/17 No                                     Comments:     POC UDS was performed in office today per verbal order of provider (GS) ; rbv'd. Any new labs or imaging since last appointment? NO    Have you been to an emergency room (ER) or urgent care clinic since your last visit? NO            Have you been hospitalized since your last visit? NO     If yes, where, when, and reason for visit? Have you seen or consulted any other health care providers outside of the [address] [place] [address]  since your last visit? YES     If yes, where, when, and reason for visit? Dentist       HM deferred to pcp.

## 2017-11-20 NOTE — PROGRESS NOTES
HISTORY OF PRESENT ILLNESS  Aidee Claros is a 50 y.o. female. HPI Comments: Visit survey reviewed  Chief complaint- chronic pain syndrome- low back pain, leg pain  Medication continues to help with general activity, mood, walking, sleep, enjoyment of life  The patient will continue with fentanyl patch 100 mcg every 2 days  Percocet 10 mg, up to 5 a day as needed  Gabapentin 400 mg 3 times a day  Continue with the compound cream    No new significant side effects reported  Medication continues to help improve quality of life. Medications reviewed including risk and benefits. Recent average level of pain-4    Measurement of clinical outcome for chronic pain patient/ SPAASMS Score Card-            Information reviewed and will be scanned. Total score today-6      Review of Systems   Constitutional: Negative for chills and fever. HENT: Negative for ear discharge and ear pain. Eyes: Negative for pain and discharge. Respiratory: Negative for hemoptysis and shortness of breath. Cardiovascular: Negative for chest pain and leg swelling. Gastrointestinal: Negative for blood in stool and melena. Musculoskeletal: Positive for back pain and myalgias. Negative for falls. Skin: Negative for itching and rash. Neurological: Negative for dizziness and seizures. Psychiatric/Behavioral: Negative for hallucinations and suicidal ideas. Physical Exam   Constitutional: She appears well-developed and well-nourished. She is cooperative. She does not have a sickly appearance. HENT:   Head: Normocephalic and atraumatic. Right Ear: External ear normal. No drainage. Left Ear: External ear normal. No drainage. Nose: Nose normal.   Eyes: Lids are normal. Right eye exhibits no discharge. Left eye exhibits no discharge. Right conjunctiva has no hemorrhage. Left conjunctiva has no hemorrhage. Neck: Neck supple. No tracheal deviation present. No thyroid mass present.    Pulmonary/Chest: Effort normal. No respiratory distress. Neurological: She is alert. No cranial nerve deficit. Skin: Skin is intact. No rash noted. Psychiatric: Her speech is normal. Her affect is not angry. She does not express inappropriate judgment. Nursing note and vitals reviewed. ASSESSMENT and PLAN  Encounter Diagnoses   Name Primary?  Chronic bilateral low back pain with bilateral sciatica Yes    Encounter for long-term (current) use of high-risk medication     Chronic pain syndrome     Osteoarthritis, unspecified osteoarthritis type, unspecified site     Osteoarthritis of lumbar spine, unspecified spinal osteoarthritis complication status     DDD (degenerative disc disease), lumbar     Spinal stenosis, lumbar region, with neurogenic claudication    --Urine test or oral swab today. Also, the prescription monitoring program was reviewed today. The majority of today's visit was spent counseling and coordinating care. Total visit time-40 minutes. -Dragon medical dictation software was used for portions of this report. Unintended errors may occur. No indicators for recent medication misuse.  reviewed. Pain Meds and Quality Of Life have been reviewed. Nonpharmacologic therapy and non-opioid pharmacologic therapy were considered. If opioid therapy is prescribed, this is only if the expected benefits are anticipated to outweigh risks. Possible changes to treatment plan considered. Support/education given as needed. Today-medications are as listed. No significant changes to medications. Follow up -- 3 months.

## 2017-11-29 NOTE — TELEPHONE ENCOUNTER
Attempted to contact patient regarding provider response; no answer at number given and unable to leave vm due to vm not set up on phone.
I did not have a discussion with the patient concerning the cost of different muscle relaxers. I do not know the specific cost based on her insurance plan. I can prescribe for a different muscle relaxer if it will save her money. She needs to look at her plan and let us know which ones she believes are reasonable from a financial standpoint.
Received call from patient regarding muscle relaxers discussed during last office visit; patient states that provider had advised that he would switch her to a more cost reasonable medication, as the one she was on was not affordable for patient, however the prescription was never called in to her pharmacy; please advise.
coffee

## 2018-02-13 ENCOUNTER — OFFICE VISIT (OUTPATIENT)
Dept: PAIN MANAGEMENT | Age: 48
End: 2018-02-13

## 2018-02-13 VITALS
HEIGHT: 64 IN | RESPIRATION RATE: 14 BRPM | BODY MASS INDEX: 24.75 KG/M2 | DIASTOLIC BLOOD PRESSURE: 73 MMHG | WEIGHT: 145 LBS | SYSTOLIC BLOOD PRESSURE: 131 MMHG | TEMPERATURE: 98.3 F | HEART RATE: 86 BPM

## 2018-02-13 DIAGNOSIS — M54.41 CHRONIC BILATERAL LOW BACK PAIN WITH BILATERAL SCIATICA: Primary | ICD-10-CM

## 2018-02-13 DIAGNOSIS — G89.29 CHRONIC BILATERAL LOW BACK PAIN WITH BILATERAL SCIATICA: Primary | ICD-10-CM

## 2018-02-13 DIAGNOSIS — G89.4 CHRONIC PAIN SYNDROME: ICD-10-CM

## 2018-02-13 DIAGNOSIS — M54.42 CHRONIC BILATERAL LOW BACK PAIN WITH BILATERAL SCIATICA: Primary | ICD-10-CM

## 2018-02-13 DIAGNOSIS — M48.062 SPINAL STENOSIS, LUMBAR REGION, WITH NEUROGENIC CLAUDICATION: ICD-10-CM

## 2018-02-13 DIAGNOSIS — M47.816 OSTEOARTHRITIS OF LUMBAR SPINE, UNSPECIFIED SPINAL OSTEOARTHRITIS COMPLICATION STATUS: ICD-10-CM

## 2018-02-13 DIAGNOSIS — Z98.84 GASTRIC BYPASS STATUS FOR OBESITY: ICD-10-CM

## 2018-02-13 DIAGNOSIS — M51.36 DDD (DEGENERATIVE DISC DISEASE), LUMBAR: ICD-10-CM

## 2018-02-13 RX ORDER — FENTANYL 100 UG/H
1 PATCH TRANSDERMAL
Qty: 15 PATCH | Refills: 0 | Status: SHIPPED | OUTPATIENT
Start: 2018-04-11 | End: 2018-05-11

## 2018-02-13 RX ORDER — WARFARIN 2.5 MG/1
2.5 TABLET ORAL DAILY
COMMUNITY
End: 2018-07-23

## 2018-02-13 RX ORDER — OXYCODONE AND ACETAMINOPHEN 10; 325 MG/1; MG/1
1 TABLET ORAL
Qty: 150 TAB | Refills: 0 | Status: SHIPPED | OUTPATIENT
Start: 2018-02-13 | End: 2018-03-15

## 2018-02-13 RX ORDER — OXYCODONE AND ACETAMINOPHEN 10; 325 MG/1; MG/1
1 TABLET ORAL
Qty: 150 TAB | Refills: 0 | Status: SHIPPED | OUTPATIENT
Start: 2018-03-12 | End: 2018-04-11

## 2018-02-13 RX ORDER — FENTANYL 100 UG/H
1 PATCH TRANSDERMAL
Qty: 15 PATCH | Refills: 0 | Status: SHIPPED | OUTPATIENT
Start: 2018-02-13 | End: 2018-03-15

## 2018-02-13 RX ORDER — OXYCODONE AND ACETAMINOPHEN 10; 325 MG/1; MG/1
1 TABLET ORAL
Qty: 150 TAB | Refills: 0 | Status: SHIPPED | OUTPATIENT
Start: 2018-04-11 | End: 2018-05-10 | Stop reason: SDUPTHER

## 2018-02-13 RX ORDER — FENTANYL 100 UG/H
1 PATCH TRANSDERMAL
Qty: 15 PATCH | Refills: 0 | Status: SHIPPED | OUTPATIENT
Start: 2018-03-12 | End: 2018-04-11

## 2018-02-13 RX ORDER — BACLOFEN 10 MG/1
10 TABLET ORAL
Qty: 90 TAB | Refills: 1 | Status: SHIPPED | OUTPATIENT
Start: 2018-02-13 | End: 2019-03-05

## 2018-02-13 RX ORDER — GABAPENTIN 400 MG/1
400 CAPSULE ORAL 3 TIMES DAILY
Qty: 90 CAP | Refills: 2 | Status: SHIPPED | OUTPATIENT
Start: 2018-02-13 | End: 2018-03-15

## 2018-02-13 NOTE — MR AVS SNAPSHOT
[address] [facility] [address] 
[phone] Patient: Rosi Mackay MRN: [id number] Visit Information Date & Time Provider Department Dept. Phone Encounter #  
 2/13/2018  8:00 AM Brian Cesar MD [facility] [id number] Follow-up Instructions Return in about 3 months (around 5/13/2018). Upcoming Health Maintenance Date Due DTaP/Tdap/Td series (1 - Tdap) 5/31/1989 PAP AKA CERVICAL CYTOLOGY 5/31/1989 Influenza Age 5 to Adult 8/1/2017 Allergies as of 2/13/2018  Review Complete On: 2/13/2018 By: Brian Cesar MD  
  
 Severity Noted Reaction Type Reactions Latex  10/31/2011    Rash Ibuprofen  06/26/2014    Nausea and Vomiting Current Immunizations  Never Reviewed No immunizations on file. Not reviewed this visit You Were Diagnosed With   
  
 Codes Comments Chronic bilateral low back pain with bilateral sciatica    -  Primary ICD-10-CM: M54.42, M54.41, G89.29 ICD-9-CM: 724.2, 724.3, 338.29 Gastric bypass status for obesity     ICD-10-CM: Z98.84 ICD-9-CM: V45.86 Osteoarthritis of lumbar spine, unspecified spinal osteoarthritis complication status     NAW-10-CK: M47.816 ICD-9-CM: 721.3 DDD (degenerative disc disease), lumbar     ICD-10-CM: M51.36 
ICD-9-CM: 722.52 Spinal stenosis, lumbar region, with neurogenic claudication     ICD-10-CM: M48.062 
ICD-9-CM: 724.03 Chronic pain syndrome     ICD-10-CM: G89.4 ICD-9-CM: 338. 4 Vitals BP Pulse Temp Resp Height(growth percentile) Weight(growth percentile) 131/73 (BP 1 Location: Right arm, BP Patient Position: Sitting) 86 98.3 °F (36.8 °C) (Oral) 14 5' 4\" (1.626 m) 145 lb (65.8 kg) BMI OB Status Smoking Status 24.89 kg/m2 Hysterectomy Never Smoker Vitals History BMI and BSA Data Body Mass Index Body Surface Area 24.89 kg/m 2 1.72 m 2 Preferred Pharmacy Pharmacy Name Phone [address] [facility], [address] Your Updated Medication List  
  
   
This list is accurate as of: 2/13/18  8:54 AM.  Always use your most recent med list.  
  
  
  
  
 Back Brace Misc  
1 Units by Does Not Apply route daily. As directed  
  
 baclofen 10 mg tablet Commonly known as:  LIORESAL Take 1 Tab by mouth three (3) times daily as needed. * fentaNYL 100 mcg/hr PATCH Commonly known as:  DURAGESIC  
1 Patch by TransDERmal route every fourty-eight (48) hours for 30 days. For chronic pain. Mylan, Sandoz, or Mallinckrodt brands only. * fentaNYL 100 mcg/hr PATCH Commonly known as:  DURAGESIC  
1 Patch by TransDERmal route every fourty-eight (48) hours for 30 days. For chronic pain. For chronic pain. Mylan, Sandoz, or Mallinckrodt brands only. Start taking on:  3/12/2018 * fentaNYL 100 mcg/hr PATCH Commonly known as:  DURAGESIC  
1 Patch by TransDERmal route every fourty-eight (48) hours for 30 days. For chronic pain. For chronic pain. Mylan, Sandoz, or Mallinckrodt brands only. Start taking on:  4/11/2018  
  
 gabapentin 400 mg capsule Commonly known as:  NEURONTIN Take 1 Cap by mouth three (3) times daily for 30 days. naloxone 4 mg/actuation nasal spray Commonly known as:  NARCAN  
4 mg by Nasal route as needed. * oxyCODONE-acetaminophen  mg per tablet Commonly known as:  PERCOCET 10 Take 1 Tab by mouth five (5) times daily for 30 days. Max Daily Amount: 5 Tabs. * oxyCODONE-acetaminophen  mg per tablet Commonly known as:  PERCOCET 10 Take 1 Tab by mouth five (5) times daily for 30 days. Max Daily Amount: 5 Tabs. Start taking on:  3/12/2018 * oxyCODONE-acetaminophen  mg per tablet Commonly known as:  PERCOCET 10 Take 1 Tab by mouth five (5) times daily for 30 days. Max Daily Amount: 5 Tabs. Start taking on:  2018  
  
 warfarin 2.5 mg tablet Commonly known as:  COUMADIN Take 2.5 mg by mouth daily. ZOLOFT 50 mg tablet Generic drug:  sertraline Take 50 mg by mouth daily. * Notice: This list has 6 medication(s) that are the same as other medications prescribed for you. Read the directions carefully, and ask your doctor or other care provider to review them with you. Prescriptions Printed Refills  
 fentaNYL (DURAGESIC) 100 mcg/hr PATCH 0 Si Patch by TransDERmal route every fourty-eight (48) hours for 30 days. For chronic pain. Mylan, Sandoz, or Mallinckrodt brands only. Class: Print Route: TransDERmal  
 oxyCODONE-acetaminophen (PERCOCET 10)  mg per tablet 0 Sig: Take 1 Tab by mouth five (5) times daily for 30 days. Max Daily Amount: 5 Tabs. Class: Print Route: Oral  
 fentaNYL (DURAGESIC) 100 mcg/hr PATCH 0 Starting on: 2018 Si Patch by TransDERmal route every fourty-eight (48) hours for 30 days. For chronic pain. For chronic pain. Mylan, Sandoz, or Mallinckrodt brands only. Class: Print Route: TransDERmal  
 oxyCODONE-acetaminophen (PERCOCET 10)  mg per tablet 0 Starting on: 2018 Sig: Take 1 Tab by mouth five (5) times daily for 30 days. Max Daily Amount: 5 Tabs. Class: Print Route: Oral  
 fentaNYL (DURAGESIC) 100 mcg/hr PATCH 0 Starting on: 3/12/2018 Si Patch by TransDERmal route every fourty-eight (48) hours for 30 days. For chronic pain. For chronic pain. Mylan, Sandoz, or Mallinckrodt brands only. Class: Print Route: TransDERmal  
 oxyCODONE-acetaminophen (PERCOCET 10)  mg per tablet 0 Starting on: 3/12/2018 Sig: Take 1 Tab by mouth five (5) times daily for 30 days. Max Daily Amount: 5 Tabs. Class: Print Route: Oral  
  
Prescriptions Sent to Pharmacy  Refills  
 baclofen (LIORESAL) 10 mg tablet 1  
 Sig: Take 1 Tab by mouth three (3) times daily as needed. Class: Normal  
 Pharmacy: [address] [facility], [address] [facility] Ph #: [phone] Route: Oral  
 gabapentin (NEURONTIN) 400 mg capsule 2 Sig: Take 1 Cap by mouth three (3) times daily for 30 days. Class: Normal  
 Pharmacy: [address] [facility], [address] [facility] Ph #: [phone] Route: Oral  
  
Follow-up Instructions Return in about 3 months (around 5/13/2018). Introducing [facility]! Sophie Thompson introduces [facility] patient portal. Now you can access parts of your medical record, email your doctor's office, and request medication refills online. 1. In your internet browser, go to [email or web address] 2. Click on the First Time User? Click Here link in the Sign In box. You will see the New Member Sign Up page. 3. Enter your [facility] Access Code exactly as it appears below. You will not need to use this code after youve completed the sign-up process. If you do not sign up before the expiration date, you must request a new code. · [facility] Access Code: [email or web address] Expires: 2/18/2018  9:01 AM 
 
4. Enter the last four digits of your Social Security Number (xxxx) and Date of Birth (mm/dd/yyyy) as indicated and click Submit. You will be taken to the next sign-up page. 5. Create a [facility] ID. This will be your [facility] login ID and cannot be changed, so think of one that is secure and easy to remember. 6. Create a [facility] password. You can change your password at any time. 7. Enter your Password Reset Question and Answer. This can be used at a later time if you forget your password. 8. Enter your e-mail address. You will receive e-mail notification when new information is available in [address] 9. Click Sign Up. You can now view and download portions of your medical record.  
10. Click the Download Summary menu link to download a portable copy of your medical information. If you have questions, please visit the Frequently Asked Questions section of the [employer] website. Remember, [facility] is NOT to be used for urgent needs. For medical emergencies, dial 911. Now available from your iPhone and Android! Please provide this summary of care documentation to your next provider. Your primary care clinician is listed as Toni Gutiérrez. If you have any questions after today's visit, please call [phone].

## 2018-02-13 NOTE — PROGRESS NOTES
Nursing Notes    Patient presents to the office today in follow-up. Patient rates her pain at 5/10 on the numerical pain scale. Reviewed medications with counts as follows:    Rx Date filled Qty Dispensed Pill Count Last Dose Short   Percocet 10-325mg 01/18/18 150 18 today no   Fentanyl 100mcg 02/02/18 15 9 yesterday no                                  Comments: The pt was counseled about reporting any medication from other physician. Percocet 5-325 mg on 01/26/18. POC UDS was not performed in office today    Any new labs or imaging since last appointment? YES,lab and xray    Have you been to an emergency room (ER) or urgent care clinic since your last visit? NO            Have you been hospitalized since your last visit? No   If yes, where, when, and reason for visit? Have you seen or consulted any other health care providers outside of the [address] [place] [address]  since your last visit? YES, OR on left foot    If yes, where, when, and reason for visit? HM deferred to pcp.

## 2018-02-13 NOTE — PROGRESS NOTES
HISTORY OF PRESENT ILLNESS  Lani Ly is a 50 y.o. female. HPI Comments: Visit survey reviewed  Chief complaint chronic pain syndrome low back pain  She will continue with fentanyl patch 100 mcg every 2 days  Percocet 10 mg, 5 times a day as needed  She will also continue with baclofen as needed  Gabapentin 400 mg 3 times a day  She had foot surgery approximately 2 weeks ago. The surgeon did prescribe some Percocet, 5 mg. She did inform me of this today and I believe she also contacted the clinic when she filled the prescription.  -The visit survey indicates that medications help general activity, mood, walking, sleep, enjoyment of life. The patient will continue medications. No new significant side effects reported  Medication continues to help improve quality of life. Medications reviewed including risk and benefits. Recent average level of pain5    Measurement of clinical outcome for chronic pain patient/ SPAASMS Score Card-            Information reviewed and will be scanned. Total score today-10      Review of Systems   Constitutional: Negative for chills and fever. HENT: Negative for ear discharge and ear pain. Eyes: Negative for pain and discharge. Respiratory: Negative for hemoptysis and shortness of breath. Cardiovascular: Negative for chest pain and leg swelling. Gastrointestinal: Negative for blood in stool and melena. Musculoskeletal: Positive for back pain and myalgias. Negative for falls. Skin: Negative for itching and rash. Neurological: Negative for dizziness and seizures. Psychiatric/Behavioral: Negative for hallucinations and suicidal ideas. Physical Exam   Constitutional: She appears well-developed and well-nourished. She is cooperative. She does not have a sickly appearance. HENT:   Head: Normocephalic and atraumatic. Right Ear: External ear normal. No drainage. Left Ear: External ear normal. No drainage.    Nose: Nose normal. Eyes: Lids are normal. Right eye exhibits no discharge. Left eye exhibits no discharge. Right conjunctiva has no hemorrhage. Left conjunctiva has no hemorrhage. Neck: Neck supple. No tracheal deviation present. No thyroid mass present. Pulmonary/Chest: Effort normal. No respiratory distress. Neurological: She is alert. No cranial nerve deficit. Skin: Skin is intact. No rash noted. Psychiatric: Her speech is normal. Her affect is not angry. She does not express inappropriate judgment. Nursing note and vitals reviewed. ASSESSMENT and PLAN  Encounter Diagnoses   Name Primary?  Chronic bilateral low back pain with bilateral sciatica Yes    Gastric bypass status for obesity     Osteoarthritis of lumbar spine, unspecified spinal osteoarthritis complication status     DDD (degenerative disc disease), lumbar     Spinal stenosis, lumbar region, with neurogenic claudication     Chronic pain syndrome    No indicators for recent medication misuse.  reviewed. Pain Meds and Quality Of Life have been reviewed. Nonpharmacologic therapy and non-opioid pharmacologic therapy were considered. If opioid therapy is prescribed, this is only if the expected benefits are anticipated to outweigh risks. Possible changes to treatment plan considered. Support/education given as needed. Today-medications are as listed. No significant changes to medications. Follow up -- 3months.

## 2018-05-10 ENCOUNTER — OFFICE VISIT (OUTPATIENT)
Dept: PAIN MANAGEMENT | Age: 48
End: 2018-05-10

## 2018-05-10 VITALS
HEART RATE: 64 BPM | HEIGHT: 64 IN | RESPIRATION RATE: 14 BRPM | SYSTOLIC BLOOD PRESSURE: 147 MMHG | TEMPERATURE: 96.5 F | WEIGHT: 151 LBS | DIASTOLIC BLOOD PRESSURE: 81 MMHG | BODY MASS INDEX: 25.78 KG/M2

## 2018-05-10 DIAGNOSIS — G89.4 CHRONIC PAIN SYNDROME: Primary | ICD-10-CM

## 2018-05-10 DIAGNOSIS — G89.29 CHRONIC BILATERAL LOW BACK PAIN WITH BILATERAL SCIATICA: ICD-10-CM

## 2018-05-10 DIAGNOSIS — Z79.899 ENCOUNTER FOR LONG-TERM (CURRENT) USE OF HIGH-RISK MEDICATION: ICD-10-CM

## 2018-05-10 DIAGNOSIS — M46.1 SACROILIITIS (HCC): ICD-10-CM

## 2018-05-10 DIAGNOSIS — M54.42 CHRONIC BILATERAL LOW BACK PAIN WITH BILATERAL SCIATICA: ICD-10-CM

## 2018-05-10 DIAGNOSIS — M47.816 SPONDYLOSIS OF LUMBAR SPINE: ICD-10-CM

## 2018-05-10 DIAGNOSIS — M54.41 CHRONIC BILATERAL LOW BACK PAIN WITH BILATERAL SCIATICA: ICD-10-CM

## 2018-05-10 DIAGNOSIS — M79.18 PIRIFORMIS MUSCLE PAIN: ICD-10-CM

## 2018-05-10 LAB
ALCOHOL UR POC: NORMAL
AMPHETAMINES UR POC: NORMAL
BARBITURATES UR POC: NEGATIVE
BENZODIAZEPINES UR POC: NORMAL
BUPRENORPHINE UR POC: NEGATIVE
CANNABINOIDS UR POC: NEGATIVE
CARISOPRODOL UR POC: NORMAL
COCAINE UR POC: NEGATIVE
FENTANYL UR POC: NORMAL
MDMA/ECSTASY UR POC: NEGATIVE
METHADONE UR POC: NEGATIVE
METHAMPHETAMINE UR POC: NEGATIVE
METHYLPHENIDATE UR POC: NEGATIVE
OPIATES UR POC: NEGATIVE
OXYCODONE UR POC: NORMAL
PHENCYCLIDINE UR POC: NORMAL
PROPOXYPHENE UR POC: NORMAL
TRAMADOL UR POC: NORMAL
TRICYCLICS UR POC: NEGATIVE

## 2018-05-10 RX ORDER — FENTANYL 75 UG/H
1 PATCH TRANSDERMAL
Qty: 15 PATCH | Refills: 0 | Status: SHIPPED | OUTPATIENT
Start: 2018-06-02 | End: 2018-06-05 | Stop reason: SDUPTHER

## 2018-05-10 RX ORDER — OXYCODONE AND ACETAMINOPHEN 10; 325 MG/1; MG/1
1 TABLET ORAL
Qty: 150 TAB | Refills: 0 | Status: SHIPPED | OUTPATIENT
Start: 2018-05-10 | End: 2018-06-05 | Stop reason: SDUPTHER

## 2018-05-10 RX ORDER — FENTANYL 75 UG/H
1 PATCH TRANSDERMAL
Qty: 15 PATCH | Refills: 0 | Status: SHIPPED | OUTPATIENT
Start: 2018-06-09 | End: 2018-05-10 | Stop reason: CLARIF

## 2018-05-10 RX ORDER — DEXTROAMPHETAMINE SACCHARATE, AMPHETAMINE ASPARTATE, DEXTROAMPHETAMINE SULFATE AND AMPHETAMINE SULFATE 2.5; 2.5; 2.5; 2.5 MG/1; MG/1; MG/1; MG/1
30 TABLET ORAL 2 TIMES DAILY
COMMUNITY

## 2018-05-10 RX ORDER — DOCUSATE SODIUM 100 MG/1
100 CAPSULE, LIQUID FILLED ORAL 2 TIMES DAILY
Qty: 60 CAP | Refills: 2 | Status: SHIPPED | OUTPATIENT
Start: 2018-05-10 | End: 2018-08-08

## 2018-05-10 RX ORDER — AMOXICILLIN 250 MG
CAPSULE ORAL
Qty: 30 TAB | Refills: 2 | Status: SHIPPED | OUTPATIENT
Start: 2018-05-10 | End: 2018-08-08 | Stop reason: SDUPTHER

## 2018-05-10 RX ORDER — TEMAZEPAM 15 MG/1
15 CAPSULE ORAL
COMMUNITY
End: 2018-07-23

## 2018-05-10 RX ORDER — AMLODIPINE BESYLATE 5 MG/1
5 TABLET ORAL DAILY
COMMUNITY
End: 2019-03-05

## 2018-05-10 NOTE — PATIENT INSTRUCTIONS
Safe Use of Opioid Pain Medicine: Care Instructions  Your Care Instructions  Pain is your body's way of warning you that something is wrong. Pain feels different for everybody. Only you can describe your pain. A doctor can suggest or prescribe many types of medicines for pain. These range from over-the-counter medicines like acetaminophen (Tylenol) to powerful medicines called opioids. Examples of opioids are fentanyl, hydrocodone, morphine, and oxycodone. Heroin is an illegal opioid  Opioids are strong medicines. They can help you manage pain when you use them the right way. But if you misuse them, they can cause serious harm and even death. For these reasons, doctors are very careful about how they prescribe opioids. If you decide to take opioids, here are some things to remember. · Keep your doctor informed. You can get addicted to opioids. The risk is higher if you have a history of substance use. Your doctor will monitor you closely for signs of misuse and addiction and to figure out when you no longer need to take opioids. · Make a treatment plan. The goal of your plan is to be able to function and do the things you need to do, even if you still have some pain. You might be able to manage your pain with other non-opioid options like physical therapy, relaxation, or over-the-counter pain medicines. · Be aware of the side effects. Opioids can cause serious side effects, such as constipation, dry mouth, and nausea. And over time, you may need a higher dose to get pain relief. This is called tolerance. Your body also gets used to opioids. This is called physical dependence. If you suddenly stop taking them, you may have withdrawal symptoms. The doctor carefully considered what pain medicine is right for you. You may not have received opioids if your doctor was concerned about drug interactions or your safety, or if he or she had other concerns. It is best to have one doctor or clinic treat your pain. This way you will get the pain medicine that will help you the most. And a doctor will be able to watch for any problems that the medicine might cause. The doctor has checked you carefully, but problems can develop later. If you notice any problems or new symptoms,  get medical treatment right away. Follow-up care is a key part of your treatment and safety. Be sure to make and go to all appointments, and call your doctor if you are having problems. It's also a good idea to know your test results and keep a list of the medicines you take. How can you care for yourself at home? · If you need to take opioids to manage your pain, remember these safety tips. ¨ Follow directions carefully. It's easy to misuse opioids if you take a dose other than what's prescribed by your doctor. This can lead to overdose and even death. Even sharing them with someone they weren't meant for is misuse. ¨ Be cautious. Opioids may affect your judgment and decision making. Do not drive or operate machinery until you can think clearly. Talk with your doctor about when it is safe to drive. ¨ Reduce the risk of drug interactions. Opioids can be dangerous if you take them with alcohol or with certain drugs like sleeping pills and muscle relaxers. Make sure your doctor knows about all the other medicines you take, including over-the-counter medicines. Don't start any new medicines before you talk to your doctor or pharmacist.  Stanislav Wright Keep others safe. Store opioids in a safe and secure place. Make sure that pets, children, friends, and family can't get to them. When you're done using opioids, make sure to properly dispose of them. You can either use a community drug take-back program or your drugstore's mail-back program. If one of these programs isn't available, you can flush opioid skin patches and unused opioid pills down the toilet. ¨ Reduce the risk of overdose. Misuse of opioids can be very dangerous.  Protect yourself by asking your doctor about a naloxone rescue kit. It can help you-and even save your life-if you take too much of an opioid. · Try other ways to reduce pain. ¨ Relax, and reduce stress. Relaxation techniques such as deep breathing or meditation can help. ¨ Keep moving. Gentle, daily exercise can help reduce pain over the long run. Try low- or no-impact exercises such as walking, swimming, and stationary biking. Do stretches to stay flexible. ¨ Try heat, cold packs, and massage. ¨ Get enough sleep. Pain can make you tired and drain your energy. Talk with your doctor if you have trouble sleeping because of pain. ¨ Think positive. Your thoughts can affect your pain level. Do things that you enjoy to distract yourself when you have pain instead of focusing on the pain. See a movie, read a book, listen to music, or spend time with a friend. · If you are not taking a prescription pain medicine, ask your doctor if you can take an over-the-counter medicine. When should you call for help? Call your doctor now or seek immediate medical care if:  ? · You have a new kind of pain. ? · You have new symptoms, such as a fever or rash, along with the pain. ? Watch closely for changes in your health, and be sure to contact your doctor if:  ? · You think you might be using too much pain medicine, and you need help to use less or stop. ? · Your pain gets worse. ? · You would like a referral to a doctor or clinic that specializes in pain management. Where can you learn more? Go to http://christiane-maryanne.info/. Enter R108 in the search box to learn more about \"Safe Use of Opioid Pain Medicine: Care Instructions. \"  Current as of: October 14, 2016  Content Version: 11.4  © 5700-3566 [employer]. Care instructions adapted under license by [employer] (which disclaims liability or warranty for this information).  If you have questions about a medical condition or this instruction, always ask your healthcare professional. [address] any warranty or liability for your use of this information. Learning About Sleeping Well  What does sleeping well mean? Sleeping well means getting enough sleep. How much sleep is enough varies among people. The number of hours you sleep is not as important as how you feel when you wake up. If you do not feel refreshed, you probably need more sleep. Another sign of not getting enough sleep is feeling tired during the day. The average total nightly sleep time is 7½ to 8 hours. Healthy adults may need a little more or a little less than this. Why is getting enough sleep important? Getting enough quality sleep is a basic part of good health. When your sleep suffers, your mood and your thoughts can suffer too. You may find yourself feeling more grumpy or stressed. Not getting enough sleep also can lead to serious problems, including injury, accidents, anxiety, and depression. What might cause poor sleeping? Many things can cause sleep problems, including:  · Stress. Stress can be caused by fear about a single event, such as giving a speech. Or you may have ongoing stress, such as worry about work or school. · Depression, anxiety, and other mental or emotional conditions. · Changes in your sleep habits or surroundings. This includes changes that happen where you sleep, such as noise, light, or sleeping in a different bed. It also includes changes in your sleep pattern, such as having jet lag or working a late shift. · Health problems, such as pain, breathing problems, and restless legs syndrome. · Lack of regular exercise. How can you help yourself? Here are some tips that may help you sleep more soundly and wake up feeling more refreshed. Your sleeping area  · Use your bedroom only for sleeping and sex. A bit of light reading may help you fall asleep. But if it doesn't, do your reading elsewhere in the house. Don't watch TV in bed.   · Be sure your bed is big enough to stretch out comfortably, especially if you have a sleep partner. · Keep your bedroom quiet, dark, and cool. Use curtains, blinds, or a sleep mask to block out light. To block out noise, use earplugs, soothing music, or a \"white noise\" machine. Your evening and bedtime routine  · Create a relaxing bedtime routine. You might want to take a warm shower or bath, listen to soothing music, or drink a cup of noncaffeinated tea. · Go to bed at the same time every night. And get up at the same time every morning, even if you feel tired. What to avoid  · Limit caffeine (coffee, tea, caffeinated sodas) during the day, and don't have any for at least 4 to 6 hours before bedtime. · Don't drink alcohol before bedtime. Alcohol can cause you to wake up more often during the night. · Don't smoke or use tobacco, especially in the evening. Nicotine can keep you awake. · Don't take naps during the day, especially close to bedtime. · Don't lie in bed awake for too long. If you can't fall asleep, or if you wake up in the middle of the night and can't get back to sleep within 15 minutes or so, get out of bed and go to another room until you feel sleepy. · Don't take medicine right before bed that may keep you awake or make you feel hyper or energized. Your doctor can tell you if your medicine may do this and if you can take it earlier in the day. If you can't sleep  · Imagine yourself in a peaceful, pleasant scene. Focus on the details and feelings of being in a place that is relaxing. · Get up and do a quiet or boring activity until you feel sleepy. · Don't drink any liquids after 6 p.m. if you wake up often because you have to go to the bathroom. Where can you learn more? Go to http://christiane-maryanne.info/. Enter Y904 in the search box to learn more about \"Learning About Sleeping Well. \"  Current as of:  May 12, 2017  Content Version: 11.4  © 3344-3690 [employer]. Care instructions adapted under license by [employer] (which disclaims liability or warranty for this information). If you have questions about a medical condition or this instruction, always ask your healthcare professional. [address] any warranty or liability for your use of this information.

## 2018-05-10 NOTE — PROGRESS NOTES
Referral date October 2011, source primary care provider and question low back pain. HPI:  Rosario Erazo is a 50 y.o. female here for f/u visit for ongoing evaluation of chronic lower back pain. Pt was last seen here on February 13, 2018. Pt denies interval changes on the character or distribution of pain. Pain is located across the lumbar spine from upper to lower lumbar spine right greater to than left through the right gluteal region as a pulling and burning that is constant. She has radiating pulling and achy pain into the posterior right thigh stopping just before the knee. Her pain ranges from 4-8 out of 10. She denies bowel bladder incontinence, weakness, or any new numbness and tingling. Her symptoms improve in the presence of her grandchildren, use of Percocet and fentanyl, heat, and lumbosacral orthotic. Symptoms worsen with prolonged walking, bending, and lifting. Her last physical therapy for her lumbosacral spine was approximately 3 years ago. She has been on opioids constantly since 2012. She has not tried TENS, chiropractic, acupuncture, pain psychology. She has had epidural steroid injection in the lumbar region for 5 times without prolonged perceived benefit. She also was in the middle of a trial of lumbosacral radiofrequency ablation that was aborted secondary to poor tolerance; she denies having sedation for that procedure. She has intermittent constipation and no bowel program.    Social History     Social History    Marital status:      Spouse name: N/A    Number of children: N/A    Years of education: N/A     Occupational History    Not on file.      Social History Main Topics    Smoking status: Never Smoker    Smokeless tobacco: Never Used    Alcohol use No    Drug use: No    Sexual activity: Yes     Birth control/ protection: Surgical      Comment: Hysterectomy     Other Topics Concern    Not on file     Social History Narrative     Family History Problem Relation Age of Onset    Cancer Other      NOS    Heart Disease Other      NOS    Stroke Father     Heart Attack Father     Breast Cancer Mother      Allergies   Allergen Reactions    Latex Rash    Ibuprofen Nausea and Vomiting     Past Medical History:   Diagnosis Date    Anemia     Chronic low back pain     Constipation     Dysmenorrhea     Eczema     Fatigue     Insomnia     Iron deficiency anemia     Neurological disorder     pain, difficulty writing    Secondary thrombocytosis    Depression  Morbid obesity      Past Surgical History:   Procedure Laterality Date    HX ABDOMINOPLASTY      HX BREAST REDUCTION      HX GASTRIC BYPASS  1997    HX HYSTERECTOMY      HX HYSTERECTOMY  2012    HX TUBAL LIGATION      bilateral     Left foot surgery to ankle and foot Jan 26 2018    Current Outpatient Prescriptions on File Prior to Visit   Medication Sig    baclofen (LIORESAL) 10 mg tablet Take 1 Tab by mouth three (3) times daily as needed.  fentaNYL (DURAGESIC) 100 mcg/hr PATCH 1 Patch by TransDERmal route every fourty-eight (48) hours for 30 days. For chronic pain. For chronic pain. Mylan, Sandoz, or [employer]inckrodt brands only.  Back Brace misc 1 Units by Does Not Apply route daily. As directed    sertraline (ZOLOFT) 50 mg tablet Take 50 mg by mouth daily.  warfarin (COUMADIN) 2.5 mg tablet Take 2.5 mg by mouth daily.  naloxone 4 mg/actuation spry 4 mg by Nasal route as needed. No current facility-administered medications on file prior to visit.           ROS:  Denies fever, chills, nausea, vomiting, cough, shortness of breath, chest pain, abdominal pain, changes in appetite, unexplained weight gain or weight loss, changes in vision, changes in hearing, focal weakness, difficulty swallowing, suicidal ideation, homicidal ideation, chronic depression, any history of substance use or abuse  Opioid specific risk:  Concurrent benzodiazepine use, history of morbid obesity, history of depression, extended period of time on opioid therapy without breaks. [place] Opioid specific history: On continuous opioid therapy with outbreaks since at least 2011. Vitals:    05/10/18 1225   BP: 147/81   Pulse: 64   Resp: 14   Temp: 96.5 °F (35.8 °C)   TempSrc: Oral   Weight: 68.5 kg (151 lb)   Height: 5' 4\" (1.626 m)   PainSc:   5   PainLoc: Back        Imaging: Most recent lumbar x-ray in September 2014 and the report states that there is moderate discogenic degenerative disease at L3-4 and L4-5 levels similar to the comparison films from August 2013. Reviewed report from MRI of lumbar spine from September 2014 which showed multiple level degenerative disc issues and multiple level bilateral facet arthroses. PE:  AFVSS slightly elevated blood pressure, no acute distress, normal body habitus with lots of loose skin over the trunk. A&OXs 3.  normocephalic, atraumatic. Conjugate gaze, clear sclerae. Speech clear and appropriate    LE:   Strength for right lower extremity is 5/5 for hip flexion, knee extension, dorsiflexion, extensor hallucis longus, plantar flexion. Strength for left lower extremity is 5/5 for hip flexion, knee extension, dorsiflexion, extensor hallucis longus, plantar flexion. Muscle Stretch reflexes for bilateral lower extremity absent at L4 and S1. Negative ankle clonus on the right and the left. Downgoing plantar response on the right and the left. Negative supine and seated straight leg raise bilaterally. Negative FABERs on the right and the left. Negative Stinchfield's on the right and a left. Negative FADIRS on the right and the left. Gait is within functional limits. Balance is within functional limits. Sensation to light touch is intact for right L2-S2 and left L2-S2. Tenderness to palpation over the left piriformis muscle and over the right sacroiliac joint.   No significant tenderness to palpation along the lumbosacral spine or along bilateral lumbar paraspinals. Full active range of motion for lumbar flexion and extension without significant change in primary pain complaints. Facet loading to the right was negative facet loading to the left causes mild increase in primary pain complaints. Non organic signs: Negative for:  -Tenderness  -Simulation  -Distraction  -regional  -reaction      Calculated MEQ -315  Naloxone rescue -has been ordered  Prophylactic bowel program -no    Last UDS today, consistent  , date checked today, findingsconsistent    Primary Care Physician  Keenan Silvia  [facility] [place] [address]      Global impression of change 7 and 2    PEREZ - 48%    COMM-0    PHQ -- . PHQ over the last two weeks 5/10/2018   PHQ Not Done -   Little interest or pleasure in doing things Not at all   Feeling down, depressed or hopeless Not at all   Total Score PHQ 2 0           DSM V-OUD Screen--positive for mild opioid use disorder    Assessment/Plan:     ICD-10-CM ICD-9-CM    1. Chronic pain syndrome G89.4 338.4 oxyCODONE-acetaminophen (PERCOCET 10)  mg per tablet      TENS Units (TENS 504) jazzmine      REFERRAL TO CHIROPRACTIC      fentaNYL (DURAGESIC) 75 mcg/hr      DISCONTINUED: fentaNYL (DURAGESIC) 75 mcg/hr   2. Encounter for long-term (current) use of high-risk medication Z79.899 V58.69 DRUG SCREEN      AMB POC DRUG SCREEN ()      docusate sodium (COLACE) 100 mg capsule      senna-docusate (PERICOLACE) 8.6-50 mg per tablet   3. Spondylosis of lumbar spine M47.816 721.3 TENS Units (TENS 504) jazzmine      REFERRAL TO CHIROPRACTIC      fentaNYL (DURAGESIC) 75 mcg/hr   4. Piriformis muscle pain M79.1 729.1 TENS Units (TENS 504) jazzmine      REFERRAL TO CHIROPRACTIC   5. Sacroiliitis (HCC) M46.1 720.2 TENS Units (TENS 504) jazzmine      REFERRAL TO CHIROPRACTIC   6.  Chronic bilateral low back pain with bilateral sciatica M54.42 724.2 oxyCODONE-acetaminophen (PERCOCET 10)  mg per tablet    M54.41 724.3 TENS Units (TENS 504) jazzmine    G89.29 338.29 REFERRAL TO CHIROPRACTIC      fentaNYL (DURAGESIC) 75 mcg/hr        Patient with multifactorial chronic pain the low back with likely involvement from lumbar and lumbosacral spondylosis, right sacroiliitis, left piriformis muscle spasm. Patient also screen positive for mild opioid use disorder. She is currently on 315 morphine equivalent doses daily by using fentanyl patch 100 mcg every 48 hours and oxycodone 10/325 up to 5 tablets daily as needed for pain and she averages 5 tablets daily. She agrees to gradual reduction and trial weaning of her opioids towards 0 as we initiate a more comprehensive and integrated plan of care for chronic pain. We will start by reducing fentanyl to 75 mcg every 48 hours this will reduce her overall morphine equivalent dosages to 255. We will maintain her on her current dose of Percocet 10/325 as needed up to 5 tablets daily every 4-6 hours. She will return to the clinic in approximately 30 days for ongoing assessment and ongoing opioid wean. If she has difficulty with the wean or difficulty with cravings we will consider referral to mental health for ongoing assessment and treatment for opioid use disorder. Patient was instructed to discontinue use of benzodiazepines for sleep and that benzodiazepines are contraindicated with concurrent use of opioids for chronic pain. Patient will be provided with a bowel program to help prevent constipation by using senna and Colace as needed. Patient will be referred for chiropractic treatment as she has never attempted this treatment and has no signs of spinal instability on physical exam and most recent imaging of the lumbosacral spine. Patient will be provided with a TENS unit to help manage her chronic pain.       GOALS:  To establish complementary and integrative plan of care to address chronic pain issues while minimizing pharmaceuticals to maximize patient's function improve quality of life. Education:  Patient again educated on the importance of strict compliance with the opioid care agreement while on opioid therapy. Patient also again educated that they should avoid driving while on chronic opioid therapy. Also advised to avoid alcohol and to avoid benzodiazepines while on opioid therapy. Patient Homework:  Continue to independently investigate yoga for persons with chronic pain. Continue bilateral piriformis stretches that were demonstrated during visit today. F/u:. Follow-up Disposition:  Return in about 4 weeks (around 6/7/2018).

## 2018-05-11 ENCOUNTER — DOCUMENTATION ONLY (OUTPATIENT)
Dept: PAIN MANAGEMENT | Age: 48
End: 2018-05-11

## 2018-05-11 NOTE — PROGRESS NOTES
The pt's order for a tens unit was faxed over to [facility] along with the last office note and demographics. A fax confirmation was received and they will contact the pt.

## 2018-05-31 ENCOUNTER — TELEPHONE (OUTPATIENT)
Dept: PAIN MANAGEMENT | Age: 48
End: 2018-05-31

## 2018-05-31 ENCOUNTER — DOCUMENTATION ONLY (OUTPATIENT)
Dept: PAIN MANAGEMENT | Age: 48
End: 2018-05-31

## 2018-05-31 NOTE — PROGRESS NOTES
The pt came into the office as a walk in and stated that she needed to have proof that her fentanyl patches have been decreased. The assistance company that she uses through [employer] is requesting that something from our office be faxed over to them. The pt's last office note and copy of fentanyl 75 mcg/hr prescription was printed and faxed over to [employer] as requested at [phone]. A fax confirmation was received. Pt aware this was done. No other requests at this time.

## 2018-05-31 NOTE — TELEPHONE ENCOUNTER
Fentanyl does not need a pa per [facility] [employer] - it is a pharmacy issue - need to enter correct pps code.

## 2018-06-05 ENCOUNTER — OFFICE VISIT (OUTPATIENT)
Dept: PAIN MANAGEMENT | Age: 48
End: 2018-06-05

## 2018-06-05 DIAGNOSIS — M47.816 SPONDYLOSIS OF LUMBAR SPINE: ICD-10-CM

## 2018-06-05 DIAGNOSIS — G89.4 CHRONIC PAIN SYNDROME: ICD-10-CM

## 2018-06-05 DIAGNOSIS — G89.29 CHRONIC BILATERAL LOW BACK PAIN WITH BILATERAL SCIATICA: ICD-10-CM

## 2018-06-05 DIAGNOSIS — M46.1 SACROILIITIS (HCC): ICD-10-CM

## 2018-06-05 DIAGNOSIS — M54.41 CHRONIC BILATERAL LOW BACK PAIN WITH BILATERAL SCIATICA: ICD-10-CM

## 2018-06-05 DIAGNOSIS — Z79.899 ENCOUNTER FOR LONG-TERM (CURRENT) USE OF HIGH-RISK MEDICATION: Primary | ICD-10-CM

## 2018-06-05 DIAGNOSIS — M54.42 CHRONIC BILATERAL LOW BACK PAIN WITH BILATERAL SCIATICA: ICD-10-CM

## 2018-06-05 DIAGNOSIS — M79.18 PIRIFORMIS MUSCLE PAIN: ICD-10-CM

## 2018-06-05 RX ORDER — OXYCODONE AND ACETAMINOPHEN 10; 325 MG/1; MG/1
1 TABLET ORAL
Qty: 150 TAB | Refills: 0 | Status: SHIPPED | OUTPATIENT
Start: 2018-06-09 | End: 2018-06-27 | Stop reason: SDUPTHER

## 2018-06-05 RX ORDER — FENTANYL 75 UG/H
1 PATCH TRANSDERMAL
Qty: 10 PATCH | Refills: 0 | Status: SHIPPED | OUTPATIENT
Start: 2018-07-01 | End: 2018-06-27 | Stop reason: SDUPTHER

## 2018-06-05 NOTE — PROGRESS NOTES
Referral date October 2011, source primary care provider and question low back pain. HPI:  Dar Gagnon is a 46 y.o. female here for f/u visit for ongoing evaluation of chronic lower back pain. Pt was last seen here on 5/10/2017. Pt denies interval changes on the character or distribution of pain. Pain is located across the lumbar spine \"running down lower leg\". The pain is described as achy and pulling . Pain at its best is 4/10. Pain at its worse is 8-9/10. The pain is worsened by prolonged walking, bending, and lifting. Symptoms are relieved by sleeping. Pt states from last visit she has had diarrhea and, therefore, she stopped the Senna and Colace bowel regimen medications. She is no longer taking Gabapentin. She is no longer taking Temazepam at night for sleep, she has tried Aleve PM with not much relief. Pt unable to see chiropractor because the two places she called do not take her insurance and she cannot afford it. Pt remains optimistic about being completely off the Fentanyl patches. Social History     Social History    Marital status:      Spouse name: N/A    Number of children: N/A    Years of education: N/A     Occupational History    Not on file.      Social History Main Topics    Smoking status: Never Smoker    Smokeless tobacco: Never Used    Alcohol use No    Drug use: No    Sexual activity: Yes     Birth control/ protection: Surgical      Comment: Hysterectomy     Other Topics Concern    Not on file     Social History Narrative     Family History   Problem Relation Age of Onset    Cancer Other      NOS    Heart Disease Other      NOS    Stroke Father     Heart Attack Father     Breast Cancer Mother      Allergies   Allergen Reactions    Latex Rash    Ibuprofen Nausea and Vomiting     Past Medical History:   Diagnosis Date    Anemia     Chronic low back pain     Constipation     Dysmenorrhea     Eczema     Fatigue     Insomnia     Iron deficiency anemia     Neurological disorder     pain, difficulty writing    Secondary thrombocytosis      Past Surgical History:   Procedure Laterality Date    HX ABDOMINOPLASTY      HX BREAST REDUCTION      HX GASTRIC BYPASS  1997    HX HYSTERECTOMY      HX HYSTERECTOMY  2012    HX TUBAL LIGATION      bilateral     Current Outpatient Prescriptions on File Prior to Visit   Medication Sig    amLODIPine (NORVASC) 5 mg tablet Take 5 mg by mouth daily.  dextroamphetamine-amphetamine (ADDERALL) 10 mg tablet Take 10 mg by mouth daily.  temazepam (RESTORIL) 15 mg capsule Take 15 mg by mouth nightly as needed for Sleep.  docusate sodium (COLACE) 100 mg capsule Take 1 Cap by mouth two (2) times a day for 90 days.  warfarin (COUMADIN) 2.5 mg tablet Take 2.5 mg by mouth daily.  baclofen (LIORESAL) 10 mg tablet Take 1 Tab by mouth three (3) times daily as needed.  Back Brace misc 1 Units by Does Not Apply route daily. As directed    sertraline (ZOLOFT) 50 mg tablet Take 50 mg by mouth daily.  senna-docusate (PERICOLACE) 8.6-50 mg per tablet 1 tablet p.o. daily as needed for constipation    TENS Units (TENS 48) jazzmine 24-year-old female with chronic lower back pain. Please provide her with a TENS unit to use to help improve her function, improve quality of life, decrease pain, and decreased medication usage. Thank you    naloxone 4 mg/actuation spry 4 mg by Nasal route as needed. No current facility-administered medications on file prior to visit. Review of Systems   Constitutional: Negative for chills, diaphoresis, fever and weight loss. Respiratory: Negative for shortness of breath and wheezing. Cardiovascular: Negative for chest pain. Gastrointestinal: Positive for diarrhea. Negative for abdominal pain, blood in stool, nausea and vomiting. Musculoskeletal: Positive for back pain. Psychiatric/Behavioral: Negative for depression, substance abuse and suicidal ideas.  The patient has insomnia. The patient is not nervous/anxious. Opioid specific risk:  Concurrent benzodiazepine use, history of morbid obesity, history of depression, extended period of time on opioid therapy without breaks. Opioid specific history: On continuous opioid therapy with outbreaks since at least 2011. Vitals:    06/05/18 1521   BP: 137/87   Pulse: 76   Resp: 13   Temp: 96.8 °F (36 °C)   TempSrc: Oral   Weight: 68.5 kg (151 lb)   Height: 5' 4\" (1.626 m)   PainSc:   7   PainLoc: Back        PE:  Physical Exam       AFVSS, slightly elevated blood pressure, no acute distress, normal body habitus. A&OXs 3. Normocephalic, atraumatic. Conjugate gaze, clear sclerae. Gait is within functional limits. Balance is within functional limits. Speech is clear and appropriate. LE:   Strength for right lower extremity is 5/5 for hip flexion, knee extension, dorsiflexion, extensor hallucis longus, plantar flexion. Strength for left lower extremity is 5/5 for hip flexion, knee extension, dorsiflexion, extensor hallucis longus, plantar flexion. Sensation to light touch is intact for right L2-S2. Sensation to light touch is intact for left L2-S2. Muscle Stretch reflexes for right lower extremity are absent for L4,  S1.   Muscle Stretch reflexes for left  lower extremity are 1+ for L4 and absent for S1. Negative ankle clonus on the right and the left. Downgoing plantar response on the right and the left. Negative supine straight leg raise bilaterally. Negative Stinchfield's on the right and a left. Negative FABERs on the right and the left. Negative FADIRS on the right and the left. Tenderness to palpation to bilateral sacroiliac joints. No lumbosacral spine tenderness. Full AROM for lumbar flexion and extension without significant change in primary pain.       Non organic signs: negative  -Tenderness    Calculated MEQ - 255  Naloxone rescue - yes  Prophylactic bowel program - yes  Date of last OCA 11/20/2017  Last UDS 5/10/18, not consistent   date checked today, findings not consistent ( no percocet on report)    Elian 1962  [address] [facility] [address]  [phone]    Today   Last Visit  PEREZ - 50%  48%  PGIC - 5 and 2 7 and 2  COMM - 2  0    PHQ -- . PHQ over the last two weeks 6/5/2018   PHQ Not Done -   Little interest or pleasure in doing things Not at all   Feeling down, depressed or hopeless Not at all   Total Score PHQ 2 0       DSM V-OUD Screen - positive for mild opioid use disorder    Assessment/Plan:     ICD-10-CM ICD-9-CM    1. Chronic pain syndrome G89.4 338.4 fentaNYL (DURAGESIC) 75 mcg/hr      oxyCODONE-acetaminophen (PERCOCET 10)  mg per tablet   2. Encounter for long-term (current) use of high-risk medication Z79.899 V58.69    3. Spondylosis of lumbar spine M47.816 721.3 fentaNYL (DURAGESIC) 75 mcg/hr   4. Piriformis muscle pain M79.1 729.1    5. Sacroiliitis (HCC) M46.1 720.2    6. Chronic bilateral low back pain with bilateral sciatica M54.42 724.2 fentaNYL (DURAGESIC) 75 mcg/hr    M54.41 724.3 oxyCODONE-acetaminophen (PERCOCET 10)  mg per tablet    G89.29 338.29       She is currently on fentanyl patch 75 mcg every 48 hours and oxycodone 10/325 up to 5 tablets daily as needed for pain and she averages 5 tables daily. We will continue with her wean towards 0. We will keep her on the fentanyl 75 mcg patches but extend to cover 72 hours. We will keep the oxycodone 10/325 up to 5 tablets daily as needed for pain the same. Once she is no longer on the long acting opioid, we will start weaning of the short acting opioid with a goal of 0 MEQ. If she has difficulty with the wean or difficulty with cravings we will consider referral to mental health for ongoing assessment and treatment for opioid use disorder. Pt advised to use OTC immodium for the diarrhea she is experiencing.  Pt also instructed to call her insurance company with regard to which chiropractors are covered by her insurance. Pt reports she is no longer taking benzodiazepines for sleep. Pt has stopped bowel regimen due to recent diarrhea. Pt never contacted regarding TENS unit, will f/u. Follow up ongoing assessment and ongoing development of integrative and comprehensive plan of care for chronic pain. GOALS:  To establish complementary and integrative plan of care to address chronic pain issues while minimizing pharmaceuticals to maximize patient's function improve quality of life. Education:  Patient again educated on the importance of strict compliance with the opioid care agreement while on opioid therapy. Patient also again educated that they should avoid driving while on chronic opioid therapy. Also advised to avoid alcohol and to avoid benzodiazepines while on opioid therapy. Patient Homework:  Continue to independently investigate yoga for persons with chronic pain. Continue independent piriformis stretching at home. F/u:. Follow-up Disposition:  Return in about 30 days (around 7/5/2018).

## 2018-06-05 NOTE — MR AVS SNAPSHOT
[address] [facility] [address] 
[phone] Patient: Nelly Jimenez MRN: [id number] QVA:[id number][birth year]96[birth year]4321 Visit Information Date & Time Provider Department Dept. Phone Encounter #  
 6/5/2018  3:30 PM Jackeline Card [facility] [phone] [id number] Follow-up Instructions Return in about 30 days (around 7/5/2018). Upcoming Health Maintenance Date Due DTaP/Tdap/Td series (1 - Tdap) 5/31/1989 PAP AKA CERVICAL CYTOLOGY 5/31/1989 MEDICARE YEARLY EXAM 3/14/2018 BREAST CANCER SCRN MAMMOGRAM 5/31/2018 FOBT Q 1 YEAR AGE 50-75 5/31/2018 Influenza Age 5 to Adult 8/1/2018 Allergies as of 6/5/2018  Review Complete On: 6/5/2018 By: Dudley Helm LPN Severity Noted Reaction Type Reactions Latex  10/31/2011    Rash Ibuprofen  06/26/2014    Nausea and Vomiting Current Immunizations  Never Reviewed No immunizations on file. Not reviewed this visit You Were Diagnosed With   
  
 Codes Comments Chronic pain syndrome    -  Primary ICD-10-CM: G89.4 ICD-9-CM: 338.4 Encounter for long-term (current) use of high-risk medication     ICD-10-CM: Z79.899 ICD-9-CM: V58.69 Spondylosis of lumbar spine     ICD-10-CM: M47.816 ICD-9-CM: 721.3 Piriformis muscle pain     ICD-10-CM: M79.1 ICD-9-CM: 729.1 Sacroiliitis ([address]     ICD-10-CM: M46.1 ICD-9-CM: 720.2 Chronic bilateral low back pain with bilateral sciatica     ICD-10-CM: M54.42, M54.41, G89.29 ICD-9-CM: 724.2, 724.3, 338.29 Vitals BP Pulse Temp Resp Height(growth percentile) Weight(growth percentile) 137/87 (BP 1 Location: Right arm, BP Patient Position: Sitting) 76 96.8 °F (36 °C) (Oral) 13 5' 4\" (1.626 m) 151 lb (68.5 kg) BMI OB Status Smoking Status 25.92 kg/m2 Hysterectomy Never Smoker Vitals History BMI and BSA Data Body Mass Index Body Surface Area  
 25.92 kg/m 2 1.76 m 2 Preferred Pharmacy Pharmacy Name Phone [phone], [address] Your Updated Medication List  
  
   
This list is accurate as of 6/5/18  4:09 PM.  Always use your most recent med list.  
  
  
  
  
 ADDERALL 10 mg tablet Generic drug:  dextroamphetamine-amphetamine Take 10 mg by mouth daily. Back Brace Misc  
1 Units by Does Not Apply route daily. As directed  
  
 baclofen 10 mg tablet Commonly known as:  LIORESAL Take 1 Tab by mouth three (3) times daily as needed. docusate sodium 100 mg capsule Commonly known as:  Olena Breech Take 1 Cap by mouth two (2) times a day for 90 days. fentaNYL 75 mcg/hr Commonly known as:  DURAGESIC  
1 Patch by TransDERmal route every seventy-two (72) hours for 30 days. Max Daily Amount: 1 Patch. For chronic pain  Indications: Chronic Pain with Opioid Tolerance Start taking on:  7/1/2018  
  
 naloxone 4 mg/actuation nasal spray Commonly known as:  NARCAN  
4 mg by Nasal route as needed. NORVASC 5 mg tablet Generic drug:  amLODIPine Take 5 mg by mouth daily. oxyCODONE-acetaminophen  mg per tablet Commonly known as:  PERCOCET 10 Take 1 Tab by mouth five (5) times daily for 30 days. Max Daily Amount: 5 Tabs. Start taking on:  6/9/2018 RESTORIL 15 mg capsule Generic drug:  temazepam  
Take 15 mg by mouth nightly as needed for Sleep.  
  
 senna-docusate 8.6-50 mg per tablet Commonly known as:  PERICOLACE  
1 tablet p.o. daily as needed for constipation TENS Units Shameka Porter Commonly known as:  TENS 48  
40-year-old female with chronic lower back pain. Please provide her with a TENS unit to use to help improve her function, improve quality of life, decrease pain, and decreased medication usage. Thank you  
  
 warfarin 2.5 mg tablet Commonly known as:  COUMADIN  
 Take 2.5 mg by mouth daily. ZOLOFT 50 mg tablet Generic drug:  sertraline Take 50 mg by mouth daily. Prescriptions Printed Refills  
 fentaNYL (DURAGESIC) 75 mcg/hr 0 Starting on: 2018 Si Patch by TransDERmal route every seventy-two (72) hours for 30 days. Max Daily Amount: 1 Patch. For chronic pain  Indications: Chronic Pain with Opioid Tolerance Class: Print Route: TransDERmal  
 oxyCODONE-acetaminophen (PERCOCET 10)  mg per tablet 0 Starting on: 2018 Sig: Take 1 Tab by mouth five (5) times daily for 30 days. Max Daily Amount: 5 Tabs. Class: Print Route: Oral  
  
Follow-up Instructions Return in about 30 days (around 2018). Patient Instructions Learning About Sleeping Well What does sleeping well mean? Sleeping well means getting enough sleep. How much sleep is enough varies among people. The number of hours you sleep is not as important as how you feel when you wake up. If you do not feel refreshed, you probably need more sleep. Another sign of not getting enough sleep is feeling tired during the day. The average total nightly sleep time is 7½ to 8 hours. Healthy adults may need a little more or a little less than this. Why is getting enough sleep important? Getting enough quality sleep is a basic part of good health. When your sleep suffers, your mood and your thoughts can suffer too. You may find yourself feeling more grumpy or stressed. Not getting enough sleep also can lead to serious problems, including injury, accidents, anxiety, and depression. What might cause poor sleeping? Many things can cause sleep problems, including: · Stress. Stress can be caused by fear about a single event, such as giving a speech. Or you may have ongoing stress, such as worry about work or school. · Depression, anxiety, and other mental or emotional conditions. · Changes in your sleep habits or surroundings. This includes changes that happen where you sleep, such as noise, light, or sleeping in a different bed. It also includes changes in your sleep pattern, such as having jet lag or working a late shift. · Health problems, such as pain, breathing problems, and restless legs syndrome. · Lack of regular exercise. How can you help yourself? Here are some tips that may help you sleep more soundly and wake up feeling more refreshed. Your sleeping area · Use your bedroom only for sleeping and sex. A bit of light reading may help you fall asleep. But if it doesn't, do your reading elsewhere in the house. Don't watch TV in bed. · Be sure your bed is big enough to stretch out comfortably, especially if you have a sleep partner. · Keep your bedroom quiet, dark, and cool. Use curtains, blinds, or a sleep mask to block out light. To block out noise, use earplugs, soothing music, or a \"white noise\" machine. Your evening and bedtime routine · Create a relaxing bedtime routine. You might want to take a warm shower or bath, listen to soothing music, or drink a cup of noncaffeinated tea. · Go to bed at the same time every night. And get up at the same time every morning, even if you feel tired. What to avoid · Limit caffeine (coffee, tea, caffeinated sodas) during the day, and don't have any for at least 4 to 6 hours before bedtime. · Don't drink alcohol before bedtime. Alcohol can cause you to wake up more often during the night. · Don't smoke or use tobacco, especially in the evening. Nicotine can keep you awake. · Don't take naps during the day, especially close to bedtime. · Don't lie in bed awake for too long. If you can't fall asleep, or if you wake up in the middle of the night and can't get back to sleep within 15 minutes or so, get out of bed and go to another room until you feel sleepy.  
· Don't take medicine right before bed that may keep you awake or make you feel hyper or energized. Your doctor can tell you if your medicine may do this and if you can take it earlier in the day. If you can't sleep · Imagine yourself in a peaceful, pleasant scene. Focus on the details and feelings of being in a place that is relaxing. · Get up and do a quiet or boring activity until you feel sleepy. · Don't drink any liquids after 6 p.m. if you wake up often because you have to go to the bathroom. Where can you learn more? Go to http://christiane-maryanne.info/. Enter Y750 in the search box to learn more about \"Learning About Sleeping Well. \" Current as of: May 12, 2017 Content Version: 11.4 © 3665-9804 [employer]. Care instructions adapted under license by [employer] (which disclaims liability or warranty for this information). If you have questions about a medical condition or this instruction, always ask your healthcare professional. [place] [address] any warranty or liability for your use of this information. Safe Use of Opioid Pain Medicine: Care Instructions Your Care Instructions Pain is your body's way of warning you that something is wrong. Pain feels different for everybody. Only you can describe your pain. A doctor can suggest or prescribe many types of medicines for pain. These range from over-the-counter medicines like acetaminophen (Tylenol) to powerful medicines called opioids. Examples of opioids are fentanyl, hydrocodone, morphine, and oxycodone. Heroin is an illegal opioid Opioids are strong medicines. They can help you manage pain when you use them the right way. But if you misuse them, they can cause serious harm and even death. For these reasons, doctors are very careful about how they prescribe opioids. If you decide to take opioids, here are some things to remember. · Keep your doctor informed. You can get addicted to opioids.  The risk is higher if you have a history of substance use. Your doctor will monitor you closely for signs of misuse and addiction and to figure out when you no longer need to take opioids. · Make a treatment plan. The goal of your plan is to be able to function and do the things you need to do, even if you still have some pain. You might be able to manage your pain with other non-opioid options like physical therapy, relaxation, or over-the-counter pain medicines. · Be aware of the side effects. Opioids can cause serious side effects, such as constipation, dry mouth, and nausea. And over time, you may need a higher dose to get pain relief. This is called tolerance. Your body also gets used to opioids. This is called physical dependence. If you suddenly stop taking them, you may have withdrawal symptoms. The doctor carefully considered what pain medicine is right for you. You may not have received opioids if your doctor was concerned about drug interactions or your safety, or if he or she had other concerns. It is best to have one doctor or clinic treat your pain. This way you will get the pain medicine that will help you the most. And a doctor will be able to watch for any problems that the medicine might cause. The doctor has checked you carefully, but problems can develop later. If you notice any problems or new symptoms,  get medical treatment right away. Follow-up care is a key part of your treatment and safety. Be sure to make and go to all appointments, and call your doctor if you are having problems. It's also a good idea to know your test results and keep a list of the medicines you take. How can you care for yourself at home? · If you need to take opioids to manage your pain, remember these safety tips. ¨ Follow directions carefully. It's easy to misuse opioids if you take a dose other than what's prescribed by your doctor.  This can lead to overdose and even death. Even sharing them with someone they weren't meant for is misuse. ¨ Be cautious. Opioids may affect your judgment and decision making. Do not drive or operate machinery until you can think clearly. Talk with your doctor about when it is safe to drive. ¨ Reduce the risk of drug interactions. Opioids can be dangerous if you take them with alcohol or with certain drugs like sleeping pills and muscle relaxers. Make sure your doctor knows about all the other medicines you take, including over-the-counter medicines. Don't start any new medicines before you talk to your doctor or pharmacist. 
Seamus Marcos Keep others safe. Store opioids in a safe and secure place. Make sure that pets, children, friends, and family can't get to them. When you're done using opioids, make sure to properly dispose of them. You can either use a community drug take-back program or your drugstore's mail-back program. If one of these programs isn't available, you can flush opioid skin patches and unused opioid pills down the toilet. ¨ Reduce the risk of overdose. Misuse of opioids can be very dangerous. Protect yourself by asking your doctor about a naloxone rescue kit. It can help you-and even save your life-if you take too much of an opioid. · Try other ways to reduce pain. ¨ Relax, and reduce stress. Relaxation techniques such as deep breathing or meditation can help. ¨ Keep moving. Gentle, daily exercise can help reduce pain over the long run. Try low- or no-impact exercises such as walking, swimming, and stationary biking. Do stretches to stay flexible. ¨ Try heat, cold packs, and massage. ¨ Get enough sleep. Pain can make you tired and drain your energy. Talk with your doctor if you have trouble sleeping because of pain. ¨ Think positive. Your thoughts can affect your pain level.  Do things that you enjoy to distract yourself when you have pain instead of focusing on the pain. See a movie, read a book, listen to music, or spend time with a friend. · If you are not taking a prescription pain medicine, ask your doctor if you can take an over-the-counter medicine. When should you call for help? Call your doctor now or seek immediate medical care if: 
? · You have a new kind of pain. ? · You have new symptoms, such as a fever or rash, along with the pain. ? Watch closely for changes in your health, and be sure to contact your doctor if: 
? · You think you might be using too much pain medicine, and you need help to use less or stop. ? · Your pain gets worse. ? · You would like a referral to a doctor or clinic that specializes in pain management. Where can you learn more? Go to http://christiane-maryanne.info/. Enter R108 in the search box to learn more about \"Safe Use of Opioid Pain Medicine: Care Instructions. \" Current as of: October 14, 2016 Content Version: 11.4 © 3891-[address]889 [address]. Care instructions adapted under license by [employer] (which disclaims liability or warranty for this information). If you have questions about a medical condition or this instruction, always ask your healthcare professional. [place] [address] any warranty or liability for your use of this information. Introducing \[facility]\"" & HEALTH SERVICES! [employer] introduces [employer] patient portal. Now you can access parts of your medical record, email your doctor's office, and request medication refills online. 1. In your internet browser, go to [email or web address] 2. Click on the First Time User? Click Here link in the Sign In box. You will see the New Member Sign Up page. 3. Enter your [employer] Access Code exactly as it appears below. You will not need to use this code after youve completed the sign-up process. If you do not sign up before the expiration date, you must request a new code. · [employer] Access Code: [email or web address] Expires: 8/8/2018  2:30 PM 
 
4. Enter the last four digits of your Social Security Number (xxxx) and Date of Birth (mm/dd/yyyy) as indicated and click Submit. You will be taken to the next sign-up page. 5. Create a [employer] ID. This will be your [employer] login ID and cannot be changed, so think of one that is secure and easy to remember. 6. Create a [employer] password. You can change your password at any time. 7. Enter your Password Reset Question and Answer. This can be used at a later time if you forget your password. 8. Enter your e-mail address. You will receive e-mail notification when new information is available in [address] 9. Click Sign Up. You can now view and download portions of your medical record. 10. Click the Download Summary menu link to download a portable copy of your medical information. If you have questions, please visit the Frequently Asked Questions section of the [employer] website. Remember, [employer] is NOT to be used for urgent needs. For medical emergencies, dial 911. Now available from your iPhone and Android! Please provide this summary of care documentation to your next provider. Your primary care clinician is listed as Rohini Green. If you have any questions after today's visit, please call [phone].

## 2018-06-07 ENCOUNTER — TELEPHONE (OUTPATIENT)
Dept: PAIN MANAGEMENT | Age: 48
End: 2018-06-07

## 2018-06-07 ENCOUNTER — HOSPITAL ENCOUNTER (EMERGENCY)
Age: 48
Discharge: HOME | End: 2018-06-07
Attending: EMERGENCY MEDICINE
Payer: MEDICARE

## 2018-06-07 VITALS
WEIGHT: 151 LBS | HEIGHT: 64 IN | RESPIRATION RATE: 18 BRPM | OXYGEN SATURATION: 100 % | BODY MASS INDEX: 25.78 KG/M2 | DIASTOLIC BLOOD PRESSURE: 81 MMHG | SYSTOLIC BLOOD PRESSURE: 131 MMHG | TEMPERATURE: 98.7 F | HEART RATE: 62 BPM

## 2018-06-07 DIAGNOSIS — G89.29 CHRONIC BILATERAL LOW BACK PAIN WITHOUT SCIATICA: Primary | ICD-10-CM

## 2018-06-07 DIAGNOSIS — M54.50 CHRONIC BILATERAL LOW BACK PAIN WITHOUT SCIATICA: Primary | ICD-10-CM

## 2018-06-07 PROCEDURE — 74011250636 HC RX REV CODE- 250/636: Performed by: EMERGENCY MEDICINE

## 2018-06-07 PROCEDURE — 96372 THER/PROPH/DIAG INJ SC/IM: CPT

## 2018-06-07 PROCEDURE — 99282 EMERGENCY DEPT VISIT SF MDM: CPT

## 2018-06-07 RX ORDER — KETOROLAC TROMETHAMINE 30 MG/ML
60 INJECTION, SOLUTION INTRAMUSCULAR; INTRAVENOUS
Status: COMPLETED | OUTPATIENT
Start: 2018-06-07 | End: 2018-06-07

## 2018-06-07 RX ADMIN — KETOROLAC TROMETHAMINE 60 MG: 30 INJECTION, SOLUTION INTRAMUSCULAR at 14:27

## 2018-06-07 NOTE — DISCHARGE INSTRUCTIONS
Back Pain: Care Instructions  Your Care Instructions    Back pain has many possible causes. It is often related to problems with muscles and ligaments of the back. It may also be related to problems with the nerves, discs, or bones of the back. Moving, lifting, standing, sitting, or sleeping in an awkward way can strain the back. Sometimes you don't notice the injury until later. Arthritis is another common cause of back pain. Although it may hurt a lot, back pain usually improves on its own within several weeks. Most people recover in 12 weeks or less. Using good home treatment and being careful not to stress your back can help you feel better sooner. Follow-up care is a key part of your treatment and safety. Be sure to make and go to all appointments, and call your doctor if you are having problems. It's also a good idea to know your test results and keep a list of the medicines you take. How can you care for yourself at home? · Sit or lie in positions that are most comfortable and reduce your pain. Try one of these positions when you lie down:  ¨ Lie on your back with your knees bent and supported by large pillows. ¨ Lie on the floor with your legs on the seat of a sofa or chair. [place]  on your side with your knees and hips bent and a pillow between your legs. ¨ Lie on your stomach if it does not make pain worse. · Do not sit up in bed, and avoid soft couches and twisted positions. Bed rest can help relieve pain at first, but it delays healing. Avoid bed rest after the first day of back pain. · Change positions every 30 minutes. If you must sit for long periods of time, take breaks from sitting. Get up and walk around, or lie in a comfortable position. · Try using a heating pad on a low or medium setting for 15 to 20 minutes every 2 or 3 hours. Try a warm shower in place of one session with the heating pad. · You can also try an ice pack for 10 to 15 minutes every 2 to 3 hours.  Put a thin cloth between the ice pack and your skin. · Take pain medicines exactly as directed. ¨ If the doctor gave you a prescription medicine for pain, take it as prescribed. ¨ If you are not taking a prescription pain medicine, ask your doctor if you can take an over-the-counter medicine. · Take short walks several times a day. You can start with 5 to 10 minutes, 3 or 4 times a day, and work up to longer walks. Walk on level surfaces and avoid hills and stairs until your back is better. · Return to work and other activities as soon as you can. Continued rest without activity is usually not good for your back. · To prevent future back pain, do exercises to stretch and strengthen your back and stomach. Learn how to use good posture, safe lifting techniques, and proper body mechanics. When should you call for help? Call your doctor now or seek immediate medical care if:  ? · You have new or worsening numbness in your legs. ? · You have new or worsening weakness in your legs. (This could make it hard to stand up.)   ? · You lose control of your bladder or bowels. ? Watch closely for changes in your health, and be sure to contact your doctor if:  ? · Your pain gets worse. ? · You are not getting better after 2 weeks. Where can you learn more? Go to http://christiane-maryanne.info/. Enter N361 in the search box to learn more about \"Back Pain: Care Instructions. \"  Current as of: March 21, 2017  Content Version: 11.4  © 0057-9965 [employer]. Care instructions adapted under license by [employer] (which disclaims liability or warranty for this information). If you have questions about a medical condition or this instruction, always ask your healthcare professional. [place] [address] any warranty or liability for your use of this information.          Chronic Pain: Care Instructions  Your Care Instructions    Chronic pain is pain that lasts a long time (months or even years) and may or may not have a clear cause. It is different from acute pain, which usually does have a clear cause-like an injury or illness-and gets better over time. Chronic pain:  · Lasts over time but may vary from day to day. · Does not go away despite efforts to end it. · May disrupt your sleep and lead to fatigue. · May cause depression or anxiety. · May make your muscles tense, causing more pain. · Can disrupt your work, hobbies, home life, and relationships with friends and family. Chronic pain is a very real condition. It is not just in your head. Treatment can help and usually includes several methods used together, such as medicines, physical therapy, exercise, and other treatments. Learning how to relax and changing negative thought patterns can also help you cope. Chronic pain is complex. Taking an active role in your treatment will help you better manage your pain. Tell your doctor if you have trouble dealing with your pain. You may have to try several things before you find what works best for you. Follow-up care is a key part of your treatment and safety. Be sure to make and go to all appointments, and call your doctor if you are having problems. It's also a good idea to know your test results and keep a list of the medicines you take. How can you care for yourself at home? · Pace yourself. Break up large jobs into smaller tasks. Save harder tasks for days when you have less pain, or go back and forth between hard tasks and easier ones. Take rest breaks. · Relax, and reduce stress. Relaxation techniques such as deep breathing or meditation can help. · Keep moving. Gentle, daily exercise can help reduce pain over the long run. Try low- or no-impact exercises such as walking, swimming, and stationary biking. Do stretches to stay flexible. · Try heat, cold packs, and massage. · Get enough sleep. Chronic pain can make you tired and drain your energy.  Talk with your doctor if you have trouble sleeping because of pain. · Think positive. Your thoughts can affect your pain level. Do things that you enjoy to distract yourself when you have pain instead of focusing on the pain. See a movie, read a book, listen to music, or spend time with a friend. · If you think you are depressed, talk to your doctor about treatment. · Keep a daily pain diary. Record how your moods, thoughts, sleep patterns, activities, and medicine affect your pain. You may find that your pain is worse during or after certain activities or when you are feeling a certain emotion. Having a record of your pain can help you and your doctor find the best ways to treat your pain. · Take pain medicines exactly as directed. ¨ If the doctor gave you a prescription medicine for pain, take it as prescribed. ¨ If you are not taking a prescription pain medicine, ask your doctor if you can take an over-the-counter medicine. Reducing constipation caused by pain medicine  · Include fruits, vegetables, beans, and whole grains in your diet each day. These foods are high in fiber. · Drink plenty of fluids, enough so that your urine is light yellow or clear like water. If you have kidney, heart, or liver disease and have to limit fluids, talk with your doctor before you increase the amount of fluids you drink. · If your doctor recommends it, get more exercise. Walking is a good choice. Bit by bit, increase the amount you walk every day. Try for at least 30 minutes on most days of the week. · Schedule time each day for a bowel movement. A daily routine may help. Take your time and do not strain when having a bowel movement. When should you call for help? Call your doctor now or seek immediate medical care if:  ? · Your pain gets worse or is out of control. ? · You feel down or blue, or you do not enjoy things like you once did. You may be depressed, which is common in people with chronic pain. Depression can be treated.    ? · You have vomiting or cramps for more than 2 hours. ? Watch closely for changes in your health, and be sure to contact your doctor if:  ? · You cannot sleep because of pain. ? · You are very worried or anxious about your pain. ? · You have trouble taking your pain medicine. ? · You have any concerns about your pain medicine. ? · You have trouble with bowel movements, such as:  ¨ No bowel movement in 3 days. ¨ Blood in the anal area, in your stool, or on the toilet paper. ¨ Diarrhea for more than 24 hours. Where can you learn more? Go to http://christiane-maryanne.info/. Enter N004 in the search box to learn more about \"Chronic Pain: Care Instructions. \"  Current as of: October 14, 2016  Content Version: 11.4  © 1030-8774 [employer]. Care instructions adapted under license by [employer] (which disclaims liability or warranty for this information). If you have questions about a medical condition or this instruction, always ask your healthcare professional. [place] [address] any warranty or liability for your use of this information.

## 2018-06-07 NOTE — ED TRIAGE NOTES
Patient c/o low back pain since yesterday. Patient is in pain management. Her doctor has been decreasing dose of fentanyl patches to try to get her off of them. She states her pain has not been this bad in years. She states pain goes down both legs.

## 2018-06-07 NOTE — ED PROVIDER NOTES
EMERGENCY DEPARTMENT HISTORY AND PHYSICAL EXAM    2:20 PM      Date: 6/7/2018  Patient Name: Yoshi Simmons    History of Presenting Illness     Chief Complaint   Patient presents with    Back Pain         History Provided By: Patient    Chief Complaint: lower back pain radiating in to bilat LE  Duration:  Days  Timing:  Chronic  Location: lower back  Quality: Sharp  Severity: 10 out of 10  Modifying Factors: movement and position changes exacerbates pain  Associated Symptoms: denies any other associated signs or symptoms      Additional History (Context): Yoshi Simmons is a 46 y.o. female with chronic lower back pain  who presents with chronic lower back pain that radiates to her leg bilaterally that has become exacerbated about 2 days ago. The pt reports she is in pain management; she gets percocet's and fentanyl patch. The report recently she spoke to her PCP on cutting down her medications; they recently changed her fentanyl patch from 100 mg to 75 mg; this happened almost a month ago. She also recently seen her PCP on Tuesday and everything was fine. Denies new injury, trauma, neck pain, weakness, numbness, or any other associated sx. No other complaints or concerns in the ED. PCP: Jeannie Santizo MD    Current Outpatient Prescriptions   Medication Sig Dispense Refill    [START ON 7/1/2018] fentaNYL (DURAGESIC) 75 mcg/hr 1 Patch by TransDERmal route every seventy-two (72) hours for 30 days. Max Daily Amount: 1 Patch. For chronic pain  Indications: Chronic Pain with Opioid Tolerance 10 Patch 0    [START ON 6/9/2018] oxyCODONE-acetaminophen (PERCOCET 10)  mg per tablet Take 1 Tab by mouth five (5) times daily for 30 days. Max Daily Amount: 5 Tabs. 150 Tab 0    amLODIPine (NORVASC) 5 mg tablet Take 5 mg by mouth daily.  dextroamphetamine-amphetamine (ADDERALL) 10 mg tablet Take 10 mg by mouth daily.       temazepam (RESTORIL) 15 mg capsule Take 15 mg by mouth nightly as needed for Sleep.      docusate sodium (COLACE) 100 mg capsule Take 1 Cap by mouth two (2) times a day for 90 days. 60 Cap 2    senna-docusate (PERICOLACE) 8.6-50 mg per tablet 1 tablet p.o. daily as needed for constipation 30 Tab 2    TENS Units (TENS 48) jazzmine 56-year-old female with chronic lower back pain. Please provide her with a TENS unit to use to help improve her function, improve quality of life, decrease pain, and decreased medication usage. Thank you 1 Device 0    warfarin (COUMADIN) 2.5 mg tablet Take 2.5 mg by mouth daily.  baclofen (LIORESAL) 10 mg tablet Take 1 Tab by mouth three (3) times daily as needed. 90 Tab 1    naloxone 4 mg/actuation spry 4 mg by Nasal route as needed. 1 Bottle 1    Back Brace misc 1 Units by Does Not Apply route daily. As directed 1 Device 1    sertraline (ZOLOFT) 50 mg tablet Take 50 mg by mouth daily. Past History     Past Medical History:  Past Medical History:   Diagnosis Date    Anemia     Chronic low back pain     Constipation     Dysmenorrhea     Eczema     Fatigue     Insomnia     Iron deficiency anemia     Neurological disorder     pain, difficulty writing    Secondary thrombocytosis        Past Surgical History:  Past Surgical History:   Procedure Laterality Date    HX ABDOMINOPLASTY      HX BREAST REDUCTION      HX GASTRIC BYPASS  1997    HX HYSTERECTOMY      HX HYSTERECTOMY  2012    HX TUBAL LIGATION      bilateral       Family History:  Family History   Problem Relation Age of Onset    Cancer Other      NOS    Heart Disease Other      NOS    Stroke Father     Heart Attack Father     Breast Cancer Mother        Social History:  Social History   Substance Use Topics    Smoking status: Never Smoker    Smokeless tobacco: Never Used    Alcohol use No       Allergies:   Allergies   Allergen Reactions    Latex Rash    Ibuprofen Nausea and Vomiting         Review of Systems       Review of Systems   Constitutional: Negative for chills and fever. Respiratory: Negative for shortness of breath. Cardiovascular: Negative for chest pain. Gastrointestinal: Negative for diarrhea, nausea and vomiting. Musculoskeletal: Positive for back pain. Negative for neck pain. Neurological: Negative for weakness and numbness. All other systems reviewed and are negative. Physical Exam     Visit Vitals    /81 (BP 1 Location: Left arm, BP Patient Position: At rest;Sitting)    Pulse 62    Temp 98.7 °F (37.1 °C)    Resp 18    Ht 5' 4\" (1.626 m)    Wt 68.5 kg (151 lb)    SpO2 100%    BMI 25.92 kg/m2         Physical Exam   Constitutional: She is oriented to person, place, and time. She appears well-developed and well-nourished. No distress. HENT:   Head: Normocephalic and atraumatic. Right Ear: External ear normal.   Left Ear: External ear normal.   Nose: Nose normal.   Mouth/Throat: Oropharynx is clear and moist.   Eyes: Conjunctivae and EOM are normal. Pupils are equal, round, and reactive to light. No scleral icterus. Neck: Normal range of motion. Neck supple. No JVD present. No tracheal deviation present. No thyromegaly present. Cardiovascular: Normal rate, regular rhythm, normal heart sounds and intact distal pulses. Exam reveals no gallop and no friction rub. No murmur heard. Pulmonary/Chest: Effort normal and breath sounds normal. She exhibits no tenderness. Abdominal: Soft. Bowel sounds are normal. She exhibits no distension. There is no tenderness. There is no rebound and no guarding. Musculoskeletal: Normal range of motion. She exhibits no edema or tenderness. Lymphadenopathy:     She has no cervical adenopathy. Neurological: She is alert and oriented to person, place, and time. No cranial nerve deficit. Coordination normal.   No sensory loss, Gait normal, Motor 5/5   Skin: Skin is warm and dry. Psychiatric: She has a normal mood and affect.  Her behavior is normal. Judgment and thought content normal. Nursing note and vitals reviewed. Diagnostic Study Results     Labs -  No results found for this or any previous visit (from the past 12 hour(s)). Radiologic Studies -   No orders to display         Medical Decision Making   I am the first provider for this patient. I reviewed the vital signs, available nursing notes, past medical history, past surgical history, family history and social history. Vital Signs-Reviewed the patient's vital signs. Records Reviewed: Nursing Notes and Old Medical Records (Time of Review: 2:20 PM)    Provider Notes (Medical Decision Making):       Diagnosis     Clinical Impression:   1. Chronic bilateral low back pain without sciatica        Disposition: D/C home    Follow-up Information     Follow up With Details Comments Contact Info    Cole Leal MD Go in 1 week Follow up [address] [facility] EMERGENCY DEPT  If symptoms worsen, As needed [address] [facility]  [phone]           Patient's Medications   Start Taking    No medications on file   Continue Taking    AMLODIPINE (NORVASC) 5 MG TABLET    Take 5 mg by mouth daily. BACK BRACE MISC    1 Units by Does Not Apply route daily. As directed    BACLOFEN (LIORESAL) 10 MG TABLET    Take 1 Tab by mouth three (3) times daily as needed. DEXTROAMPHETAMINE-AMPHETAMINE (ADDERALL) 10 MG TABLET    Take 10 mg by mouth daily. DOCUSATE SODIUM (COLACE) 100 MG CAPSULE    Take 1 Cap by mouth two (2) times a day for 90 days. FENTANYL (DURAGESIC) 75 MCG/HR    1 Patch by TransDERmal route every seventy-two (72) hours for 30 days. Max Daily Amount: 1 Patch. For chronic pain  Indications: Chronic Pain with Opioid Tolerance    NALOXONE 4 MG/ACTUATION SPRY    4 mg by Nasal route as needed. OXYCODONE-ACETAMINOPHEN (PERCOCET 10)  MG PER TABLET    Take 1 Tab by mouth five (5) times daily for 30 days. Max Daily Amount: 5 Tabs. SENNA-DOCUSATE (PERICOLACE) 8.6-50 MG PER TABLET    1 tablet p.o. daily as needed for constipation    SERTRALINE (ZOLOFT) 50 MG TABLET    Take 50 mg by mouth daily. TEMAZEPAM (RESTORIL) 15 MG CAPSULE    Take 15 mg by mouth nightly as needed for Sleep. TENS UNITS (TENS 504) KARSTEN    77-year-old female with chronic lower back pain. Please provide her with a TENS unit to use to help improve her function, improve quality of life, decrease pain, and decreased medication usage. Thank you    WARFARIN (COUMADIN) 2.5 MG TABLET    Take 2.5 mg by mouth daily. These Medications have changed    No medications on file   Stop Taking    No medications on file     _______________________________    Attestations:  Leandro Marion acting as a scribe for and in the presence of Marley Dasilva MD      June 07, 2018 at 2:20 PM       Provider Attestation:      I personally performed the services described in the documentation, reviewed the documentation, as recorded by the scribe in my presence, and it accurately and completely records my words and actions. June 07, 2018 at 2:20 PM - Marley Dasilva MD    _______________________________  Pt understands and agrees with dispo and F/U plan.   Marley Dasilva MD  2:47 PM

## 2018-06-07 NOTE — TELEPHONE ENCOUNTER
Received call from patient stating that she is in increased pain; patient has been to emergency dept with no relief; patient is requesting advice; please advise.

## 2018-06-08 NOTE — TELEPHONE ENCOUNTER
Please encourage patient to stick with the plan of care that she is doing great. She may add over-the-counter Tylenol 500 mg up to twice daily as needed for pain in addition to what she already receives. Also increase in alternate use of ice and heat to the painful areas as needed for pain. Remember, that any new complaints require a new workup with primary care provider or emergency department as appropriate.

## 2018-06-26 ENCOUNTER — TELEPHONE (OUTPATIENT)
Dept: PAIN MANAGEMENT | Age: 48
End: 2018-06-26

## 2018-06-26 NOTE — TELEPHONE ENCOUNTER
Returned patient call regarding prescription refill and appointment. Patient was scheduled for the first avail July 23rd. Please call patient at [phone].

## 2018-06-27 VITALS
SYSTOLIC BLOOD PRESSURE: 137 MMHG | TEMPERATURE: 96.8 F | RESPIRATION RATE: 13 BRPM | WEIGHT: 151 LBS | BODY MASS INDEX: 25.78 KG/M2 | DIASTOLIC BLOOD PRESSURE: 87 MMHG | HEIGHT: 64 IN | HEART RATE: 76 BPM

## 2018-06-27 RX ORDER — OXYCODONE AND ACETAMINOPHEN 10; 325 MG/1; MG/1
1 TABLET ORAL
Qty: 150 TAB | Refills: 0 | Status: SHIPPED | OUTPATIENT
Start: 2018-06-09 | End: 2018-07-03 | Stop reason: SDUPTHER

## 2018-06-27 RX ORDER — FENTANYL 75 UG/H
1 PATCH TRANSDERMAL
Qty: 10 PATCH | Refills: 0 | Status: SHIPPED | OUTPATIENT
Start: 2018-07-01 | End: 2018-07-23

## 2018-06-27 NOTE — PROGRESS NOTES
Referral date October 2011, source primary care provider and question low back pain. HPI:  Tram Hodges is a 46 y.o. female here for f/u visit for ongoing evaluation of chronic lower back pain. Pt was last seen here on 5/10/2017. Pt denies interval changes on the character or distribution of pain. Pain is located across the lumbar spine \"running down lower leg\". The pain is described as achy and pulling . Pain at its best is 4/10. Pain at its worse is 8-9/10. The pain is worsened by prolonged walking, bending, and lifting. Symptoms are relieved by sleeping. Pt states from last visit she has had diarrhea and, therefore, she stopped the Senna and Colace bowel regimen medications. She is no longer taking Gabapentin. She is no longer taking Temazepam at night for sleep, she has tried Aleve PM with not much relief. Pt unable to see chiropractor because the two places she called do not take her insurance and she cannot afford it. Pt remains optimistic about being completely off the Fentanyl patches. Social History     Social History    Marital status:      Spouse name: N/A    Number of children: N/A    Years of education: N/A     Occupational History    Not on file.      Social History Main Topics    Smoking status: Never Smoker    Smokeless tobacco: Never Used    Alcohol use No    Drug use: No    Sexual activity: Yes     Birth control/ protection: Surgical      Comment: Hysterectomy     Other Topics Concern    Not on file     Social History Narrative     Family History   Problem Relation Age of Onset    Cancer Other      NOS    Heart Disease Other      NOS    Stroke Father     Heart Attack Father     Breast Cancer Mother      Allergies   Allergen Reactions    Latex Rash    Ibuprofen Nausea and Vomiting     Past Medical History:   Diagnosis Date    Anemia     Chronic low back pain     Constipation     Dysmenorrhea     Eczema     Fatigue     Insomnia     Iron deficiency anemia     Neurological disorder     pain, difficulty writing    Secondary thrombocytosis      Past Surgical History:   Procedure Laterality Date    HX ABDOMINOPLASTY      HX BREAST REDUCTION      HX GASTRIC BYPASS  1997    HX HYSTERECTOMY      HX HYSTERECTOMY  2012    HX TUBAL LIGATION      bilateral     Current Outpatient Prescriptions on File Prior to Visit   Medication Sig    amLODIPine (NORVASC) 5 mg tablet Take 5 mg by mouth daily.  dextroamphetamine-amphetamine (ADDERALL) 10 mg tablet Take 10 mg by mouth daily.  temazepam (RESTORIL) 15 mg capsule Take 15 mg by mouth nightly as needed for Sleep.  docusate sodium (COLACE) 100 mg capsule Take 1 Cap by mouth two (2) times a day for 90 days.  warfarin (COUMADIN) 2.5 mg tablet Take 2.5 mg by mouth daily.  baclofen (LIORESAL) 10 mg tablet Take 1 Tab by mouth three (3) times daily as needed.  Back Brace misc 1 Units by Does Not Apply route daily. As directed    sertraline (ZOLOFT) 50 mg tablet Take 50 mg by mouth daily.  senna-docusate (PERICOLACE) 8.6-50 mg per tablet 1 tablet p.o. daily as needed for constipation    TENS Units (TENS 48) jazzmine 26-year-old female with chronic lower back pain. Please provide her with a TENS unit to use to help improve her function, improve quality of life, decrease pain, and decreased medication usage. Thank you    naloxone 4 mg/actuation spry 4 mg by Nasal route as needed. No current facility-administered medications on file prior to visit. Review of Systems   Constitutional: Negative for chills, diaphoresis, fever and weight loss. Respiratory: Negative for shortness of breath and wheezing. Cardiovascular: Negative for chest pain. Gastrointestinal: Positive for diarrhea. Negative for abdominal pain, blood in stool, nausea and vomiting. Musculoskeletal: Positive for back pain. Psychiatric/Behavioral: Negative for depression, substance abuse and suicidal ideas.  The patient has insomnia. The patient is not nervous/anxious. Opioid specific risk:  Concurrent benzodiazepine use, history of morbid obesity, history of depression, extended period of time on opioid therapy without breaks. Opioid specific history: On continuous opioid therapy with outbreaks since at least 2011. Vitals:    06/05/18 1521   BP: 137/87   Pulse: 76   Resp: 13   Temp: 96.8 °F (36 °C)   TempSrc: Oral   Weight: 68.5 kg (151 lb)   Height: 5' 4\" (1.626 m)   PainSc:   7   PainLoc: Back        PE:  Physical Exam       AFVSS, slightly elevated blood pressure, no acute distress, normal body habitus. A&OXs 3. Normocephalic, atraumatic. Conjugate gaze, clear sclerae. Gait is within functional limits. Balance is within functional limits. Speech is clear and appropriate. LE:   Strength for right lower extremity is 5/5 for hip flexion, knee extension, dorsiflexion, extensor hallucis longus, plantar flexion. Strength for left lower extremity is 5/5 for hip flexion, knee extension, dorsiflexion, extensor hallucis longus, plantar flexion. Sensation to light touch is intact for right L2-S2. Sensation to light touch is intact for left L2-S2. Muscle Stretch reflexes for right lower extremity are absent for L4,  S1.   Muscle Stretch reflexes for left  lower extremity are 1+ for L4 and absent for S1. Negative ankle clonus on the right and the left. Downgoing plantar response on the right and the left. Negative supine straight leg raise bilaterally. Negative Stinchfield's on the right and a left. Negative FABERs on the right and the left. Negative FADIRS on the right and the left. Tenderness to palpation to bilateral sacroiliac joints. No lumbosacral spine tenderness. Full AROM for lumbar flexion and extension without significant change in primary pain.       Non organic signs: negative  -Tenderness    Calculated MEQ - 255  Naloxone rescue - yes  Prophylactic bowel program - yes  Date of last OCA 11/20/2017  Last UDS 5/10/18, not consistent   date checked today, findings not consistent ( no percocet on report)    Elian 1962  [address]  [phone]    Today   Last Visit  PEREZ - 50%  48%  PGIC - 5 and 2 7 and 2  COMM - 2  0    PHQ -- . PHQ over the last two weeks 6/5/2018   PHQ Not Done -   Little interest or pleasure in doing things Not at all   Feeling down, depressed or hopeless Not at all   Total Score PHQ 2 0       DSM V-OUD Screen - positive for mild opioid use disorder    Assessment/Plan:     ICD-10-CM ICD-9-CM    1. Chronic pain syndrome G89.4 338.4 fentaNYL (DURAGESIC) 75 mcg/hr      oxyCODONE-acetaminophen (PERCOCET 10)  mg per tablet   2. Encounter for long-term (current) use of high-risk medication Z79.899 V58.69    3. Spondylosis of lumbar spine M47.816 721.3 fentaNYL (DURAGESIC) 75 mcg/hr   4. Piriformis muscle pain M79.1 729.1    5. Sacroiliitis (HCC) M46.1 720.2    6. Chronic bilateral low back pain with bilateral sciatica M54.42 724.2 fentaNYL (DURAGESIC) 75 mcg/hr    M54.41 724.3 oxyCODONE-acetaminophen (PERCOCET 10)  mg per tablet    G89.29 338.29       She is currently on fentanyl patch 75 mcg every 48 hours and oxycodone 10/325 up to 5 tablets daily as needed for pain and she averages 5 tables daily. We will continue with her wean towards 0. We will keep her on the fentanyl 75 mcg patches but extend to cover 72 hours. We will keep the oxycodone 10/325 up to 5 tablets daily as needed for pain the same. Once she is no longer on the long acting opioid, we will start weaning of the short acting opioid with a goal of 0 MEQ. If she has difficulty with the wean or difficulty with cravings we will consider referral to mental health for ongoing assessment and treatment for opioid use disorder. Pt advised to use OTC immodium for the diarrhea she is experiencing.  Pt also instructed to call her insurance company with regard to which chiropractors are covered by her insurance. Pt reports she is no longer taking benzodiazepines for sleep. Pt has stopped bowel regimen due to recent diarrhea. Pt never contacted regarding TENS unit, will f/u. Follow up ongoing assessment and ongoing development of integrative and comprehensive plan of care for chronic pain. GOALS:  To establish complementary and integrative plan of care to address chronic pain issues while minimizing pharmaceuticals to maximize patient's function improve quality of life. Education:  Patient again educated on the importance of strict compliance with the opioid care agreement while on opioid therapy. Patient also again educated that they should avoid driving while on chronic opioid therapy. Also advised to avoid alcohol and to avoid benzodiazepines while on opioid therapy. Patient Homework:  Continue to independently investigate yoga for persons with chronic pain. Continue independent piriformis stretching at home. F/u:. Follow-up Disposition:  Return in about 30 days (around 7/5/2018).

## 2018-06-27 NOTE — PROGRESS NOTES
Nursing Notes    Patient presents to the office today in follow-up. Patient rates her pain at 7/10 on the numerical pain scale. Reviewed medications with counts as follows:    Rx Date filled Qty Dispensed Pill Count Last Dose Short   Fentanyl 75 mcg/hr 06/02/18 15 13+1 on  Current patch on right arm no   Percocet 10/325 mg 05/10/18 150 16 today no   Pt has not heard from TENS unit company. Pt will be given number to call. Order was faxed over to [facility] on 05/11/18                           POC UDS was not performed in office today. Any new labs or imaging since last appointment? NO    Have you been to an emergency room (ER) or urgent care clinic since your last visit? NO            Have you been hospitalized since your last visit? NO     If yes, where, when, and reason for visit? Have you seen or consulted any other health care providers outside of the [address] [place] [address]  since your last visit? NO     If yes, where, when, and reason for visit? Pt states that chiropractor was too expensive for visits, not covered by insurance. Ms. Steve Antunez has a reminder for a \"due or due soon\" health maintenance. I have asked that she contact her primary care provider for follow-up on this health maintenance.

## 2018-06-27 NOTE — PATIENT INSTRUCTIONS
Learning About Sleeping Well  What does sleeping well mean? Sleeping well means getting enough sleep. How much sleep is enough varies among people. The number of hours you sleep is not as important as how you feel when you wake up. If you do not feel refreshed, you probably need more sleep. Another sign of not getting enough sleep is feeling tired during the day. The average total nightly sleep time is 7½ to 8 hours. Healthy adults may need a little more or a little less than this. Why is getting enough sleep important? Getting enough quality sleep is a basic part of good health. When your sleep suffers, your mood and your thoughts can suffer too. You may find yourself feeling more grumpy or stressed. Not getting enough sleep also can lead to serious problems, including injury, accidents, anxiety, and depression. What might cause poor sleeping? Many things can cause sleep problems, including:  · Stress. Stress can be caused by fear about a single event, such as giving a speech. Or you may have ongoing stress, such as worry about work or school. · Depression, anxiety, and other mental or emotional conditions. · Changes in your sleep habits or surroundings. This includes changes that happen where you sleep, such as noise, light, or sleeping in a different bed. It also includes changes in your sleep pattern, such as having jet lag or working a late shift. · Health problems, such as pain, breathing problems, and restless legs syndrome. · Lack of regular exercise. How can you help yourself? Here are some tips that may help you sleep more soundly and wake up feeling more refreshed. Your sleeping area  · Use your bedroom only for sleeping and sex. A bit of light reading may help you fall asleep. But if it doesn't, do your reading elsewhere in the house. Don't watch TV in bed. · Be sure your bed is big enough to stretch out comfortably, especially if you have a sleep partner.   · Keep your bedroom quiet, dark, and cool. Use curtains, blinds, or a sleep mask to block out light. To block out noise, use earplugs, soothing music, or a \"white noise\" machine. Your evening and bedtime routine  · Create a relaxing bedtime routine. You might want to take a warm shower or bath, listen to soothing music, or drink a cup of noncaffeinated tea. · Go to bed at the same time every night. And get up at the same time every morning, even if you feel tired. What to avoid  · Limit caffeine (coffee, tea, caffeinated sodas) during the day, and don't have any for at least 4 to 6 hours before bedtime. · Don't drink alcohol before bedtime. Alcohol can cause you to wake up more often during the night. · Don't smoke or use tobacco, especially in the evening. Nicotine can keep you awake. · Don't take naps during the day, especially close to bedtime. · Don't lie in bed awake for too long. If you can't fall asleep, or if you wake up in the middle of the night and can't get back to sleep within 15 minutes or so, get out of bed and go to another room until you feel sleepy. · Don't take medicine right before bed that may keep you awake or make you feel hyper or energized. Your doctor can tell you if your medicine may do this and if you can take it earlier in the day. If you can't sleep  · Imagine yourself in a peaceful, pleasant scene. Focus on the details and feelings of being in a place that is relaxing. · Get up and do a quiet or boring activity until you feel sleepy. · Don't drink any liquids after 6 p.m. if you wake up often because you have to go to the bathroom. Where can you learn more? Go to http://christiane-maryanne.info/. Enter O999 in the search box to learn more about \"Learning About Sleeping Well. \"  Current as of: May 12, 2017  Content Version: 11.4  © 4266-3363 Healthwise, [employer].  Care instructions adapted under license by [employer] (which disclaims liability or warranty for this information). If you have questions about a medical condition or this instruction, always ask your healthcare professional. [address] any warranty or liability for your use of this information. Safe Use of Opioid Pain Medicine: Care Instructions  Your Care Instructions  Pain is your body's way of warning you that something is wrong. Pain feels different for everybody. Only you can describe your pain. A doctor can suggest or prescribe many types of medicines for pain. These range from over-the-counter medicines like acetaminophen (Tylenol) to powerful medicines called opioids. Examples of opioids are fentanyl, hydrocodone, morphine, and oxycodone. Heroin is an illegal opioid  Opioids are strong medicines. They can help you manage pain when you use them the right way. But if you misuse them, they can cause serious harm and even death. For these reasons, doctors are very careful about how they prescribe opioids. If you decide to take opioids, here are some things to remember. · Keep your doctor informed. You can get addicted to opioids. The risk is higher if you have a history of substance use. Your doctor will monitor you closely for signs of misuse and addiction and to figure out when you no longer need to take opioids. · Make a treatment plan. The goal of your plan is to be able to function and do the things you need to do, even if you still have some pain. You might be able to manage your pain with other non-opioid options like physical therapy, relaxation, or over-the-counter pain medicines. · Be aware of the side effects. Opioids can cause serious side effects, such as constipation, dry mouth, and nausea. And over time, you may need a higher dose to get pain relief. This is called tolerance. Your body also gets used to opioids. This is called physical dependence. If you suddenly stop taking them, you may have withdrawal symptoms.   The doctor carefully considered what pain medicine is right for you. You may not have received opioids if your doctor was concerned about drug interactions or your safety, or if he or she had other concerns. It is best to have one doctor or clinic treat your pain. This way you will get the pain medicine that will help you the most. And a doctor will be able to watch for any problems that the medicine might cause. The doctor has checked you carefully, but problems can develop later. If you notice any problems or new symptoms,  get medical treatment right away. Follow-up care is a key part of your treatment and safety. Be sure to make and go to all appointments, and call your doctor if you are having problems. It's also a good idea to know your test results and keep a list of the medicines you take. How can you care for yourself at home? · If you need to take opioids to manage your pain, remember these safety tips. ¨ Follow directions carefully. It's easy to misuse opioids if you take a dose other than what's prescribed by your doctor. This can lead to overdose and even death. Even sharing them with someone they weren't meant for is misuse. ¨ Be cautious. Opioids may affect your judgment and decision making. Do not drive or operate machinery until you can think clearly. Talk with your doctor about when it is safe to drive. ¨ Reduce the risk of drug interactions. Opioids can be dangerous if you take them with alcohol or with certain drugs like sleeping pills and muscle relaxers. Make sure your doctor knows about all the other medicines you take, including over-the-counter medicines. Don't start any new medicines before you talk to your doctor or pharmacist.  [facility] Keep others safe. Store opioids in a safe and secure place. Make sure that pets, children, friends, and family can't get to them. When you're done using opioids, make sure to properly dispose of them.  You can either use a community drug take-back program or your drugstore's mail-back program. If one of these programs isn't available, you can flush opioid skin patches and unused opioid pills down the toilet. ¨ Reduce the risk of overdose. Misuse of opioids can be very dangerous. Protect yourself by asking your doctor about a naloxone rescue kit. It can help you-and even save your life-if you take too much of an opioid. · Try other ways to reduce pain. ¨ Relax, and reduce stress. Relaxation techniques such as deep breathing or meditation can help. ¨ Keep moving. Gentle, daily exercise can help reduce pain over the long run. Try low- or no-impact exercises such as walking, swimming, and stationary biking. Do stretches to stay flexible. ¨ Try heat, cold packs, and massage. ¨ Get enough sleep. Pain can make you tired and drain your energy. Talk with your doctor if you have trouble sleeping because of pain. ¨ Think positive. Your thoughts can affect your pain level. Do things that you enjoy to distract yourself when you have pain instead of focusing on the pain. See a movie, read a book, listen to music, or spend time with a friend. · If you are not taking a prescription pain medicine, ask your doctor if you can take an over-the-counter medicine. When should you call for help? Call your doctor now or seek immediate medical care if:  ? · You have a new kind of pain. ? · You have new symptoms, such as a fever or rash, along with the pain. ? Watch closely for changes in your health, and be sure to contact your doctor if:  ? · You think you might be using too much pain medicine, and you need help to use less or stop. ? · Your pain gets worse. ? · You would like a referral to a doctor or clinic that specializes in pain management. Where can you learn more? Go to http://christiane-maryanne.info/. Enter R108 in the search box to learn more about \"Safe Use of Opioid Pain Medicine: Care Instructions. \"  Current as of: October 14, 2016  Content Version: 11.4  © 5891-0007 [employer]wise [employer]. Care instructions adapted under license by [employer] (which disclaims liability or warranty for this information). If you have questions about a medical condition or this instruction, always ask your healthcare professional. [address] any warranty or liability for your use of this information.

## 2018-07-03 DIAGNOSIS — M54.41 CHRONIC BILATERAL LOW BACK PAIN WITH BILATERAL SCIATICA: ICD-10-CM

## 2018-07-03 DIAGNOSIS — G89.4 CHRONIC PAIN SYNDROME: ICD-10-CM

## 2018-07-03 DIAGNOSIS — M54.42 CHRONIC BILATERAL LOW BACK PAIN WITH BILATERAL SCIATICA: ICD-10-CM

## 2018-07-03 DIAGNOSIS — G89.29 CHRONIC BILATERAL LOW BACK PAIN WITH BILATERAL SCIATICA: ICD-10-CM

## 2018-07-03 NOTE — TELEPHONE ENCOUNTER
Bethany Gregory has called requesting a refill of their controlled medication, percocet, for the management of chronic pain. Last office visit date: 06/05/18    Date last  was pulled and reviewed : 07/03//18    Was the patient compliant when the above report was pulled? yes    Analgesia: 98% relief noted    Aberrancies: none    ADL's: patient executes ADL's    Adverse Reaction: denies any    Provider's last note and plan of care reviewed? yes  Request forwarded to provider for review.

## 2018-07-05 RX ORDER — OXYCODONE AND ACETAMINOPHEN 10; 325 MG/1; MG/1
1 TABLET ORAL
Qty: 120 TAB | Refills: 0 | Status: SHIPPED | OUTPATIENT
Start: 2018-07-05 | End: 2018-07-23 | Stop reason: SDUPTHER

## 2018-07-23 ENCOUNTER — OFFICE VISIT (OUTPATIENT)
Dept: PAIN MANAGEMENT | Age: 48
End: 2018-07-23

## 2018-07-23 VITALS
DIASTOLIC BLOOD PRESSURE: 86 MMHG | WEIGHT: 151 LBS | RESPIRATION RATE: 13 BRPM | HEIGHT: 64 IN | BODY MASS INDEX: 25.78 KG/M2 | TEMPERATURE: 95.9 F | SYSTOLIC BLOOD PRESSURE: 150 MMHG | HEART RATE: 75 BPM

## 2018-07-23 DIAGNOSIS — Z79.899 ENCOUNTER FOR LONG-TERM (CURRENT) USE OF HIGH-RISK MEDICATION: ICD-10-CM

## 2018-07-23 DIAGNOSIS — G89.4 CHRONIC PAIN SYNDROME: ICD-10-CM

## 2018-07-23 DIAGNOSIS — M54.41 CHRONIC BILATERAL LOW BACK PAIN WITH BILATERAL SCIATICA: Primary | ICD-10-CM

## 2018-07-23 DIAGNOSIS — G89.29 CHRONIC BILATERAL LOW BACK PAIN WITH BILATERAL SCIATICA: Primary | ICD-10-CM

## 2018-07-23 DIAGNOSIS — M54.42 CHRONIC BILATERAL LOW BACK PAIN WITH BILATERAL SCIATICA: Primary | ICD-10-CM

## 2018-07-23 DIAGNOSIS — M79.18 PIRIFORMIS MUSCLE PAIN: ICD-10-CM

## 2018-07-23 DIAGNOSIS — M47.816 SPONDYLOSIS OF LUMBAR SPINE: ICD-10-CM

## 2018-07-23 DIAGNOSIS — M46.1 SACROILIITIS (HCC): ICD-10-CM

## 2018-07-23 RX ORDER — OXYCODONE AND ACETAMINOPHEN 10; 325 MG/1; MG/1
TABLET ORAL
Qty: 140 TAB | Refills: 0 | Status: SHIPPED | OUTPATIENT
Start: 2018-07-28 | End: 2018-08-28 | Stop reason: SDUPTHER

## 2018-07-23 NOTE — PATIENT INSTRUCTIONS
Learning About Sleeping Well  What does sleeping well mean? Sleeping well means getting enough sleep. How much sleep is enough varies among people. The number of hours you sleep is not as important as how you feel when you wake up. If you do not feel refreshed, you probably need more sleep. Another sign of not getting enough sleep is feeling tired during the day. The average total nightly sleep time is 7½ to 8 hours. Healthy adults may need a little more or a little less than this. Why is getting enough sleep important? Getting enough quality sleep is a basic part of good health. When your sleep suffers, your mood and your thoughts can suffer too. You may find yourself feeling more grumpy or stressed. Not getting enough sleep also can lead to serious problems, including injury, accidents, anxiety, and depression. What might cause poor sleeping? Many things can cause sleep problems, including:  · Stress. Stress can be caused by fear about a single event, such as giving a speech. Or you may have ongoing stress, such as worry about work or school. · Depression, anxiety, and other mental or emotional conditions. · Changes in your sleep habits or surroundings. This includes changes that happen where you sleep, such as noise, light, or sleeping in a different bed. It also includes changes in your sleep pattern, such as having jet lag or working a late shift. · Health problems, such as pain, breathing problems, and restless legs syndrome. · Lack of regular exercise. How can you help yourself? Here are some tips that may help you sleep more soundly and wake up feeling more refreshed. Your sleeping area  · Use your bedroom only for sleeping and sex. A bit of light reading may help you fall asleep. But if it doesn't, do your reading elsewhere in the house. Don't watch TV in bed. · Be sure your bed is big enough to stretch out comfortably, especially if you have a sleep partner.   · Keep your bedroom quiet, dark, and cool. Use curtains, blinds, or a sleep mask to block out light. To block out noise, use earplugs, soothing music, or a \"white noise\" machine. Your evening and bedtime routine  · Create a relaxing bedtime routine. You might want to take a warm shower or bath, listen to soothing music, or drink a cup of noncaffeinated tea. · Go to bed at the same time every night. And get up at the same time every morning, even if you feel tired. What to avoid  · Limit caffeine (coffee, tea, caffeinated sodas) during the day, and don't have any for at least 4 to 6 hours before bedtime. · Don't drink alcohol before bedtime. Alcohol can cause you to wake up more often during the night. · Don't smoke or use tobacco, especially in the evening. Nicotine can keep you awake. · Don't take naps during the day, especially close to bedtime. · Don't lie in bed awake for too long. If you can't fall asleep, or if you wake up in the middle of the night and can't get back to sleep within 15 minutes or so, get out of bed and go to another room until you feel sleepy. · Don't take medicine right before bed that may keep you awake or make you feel hyper or energized. Your doctor can tell you if your medicine may do this and if you can take it earlier in the day. If you can't sleep  · Imagine yourself in a peaceful, pleasant scene. Focus on the details and feelings of being in a place that is relaxing. · Get up and do a quiet or boring activity until you feel sleepy. · Don't drink any liquids after 6 p.m. if you wake up often because you have to go to the bathroom. Where can you learn more? Go to http://christiane-maryanne.info/. Enter H137 in the search box to learn more about \"Learning About Sleeping Well. \"  Current as of: December 7, 2017  Content Version: 11.7  © 9130-0598 [employer].  Care instructions adapted under license by [employer] (which disclaims liability or warranty for this information). If you have questions about a medical condition or this instruction, always ask your healthcare professional. [address] any warranty or liability for your use of this information. Safe Use of Opioid Pain Medicine: Care Instructions  Your Care Instructions  Pain is your body's way of warning you that something is wrong. Pain feels different for everybody. Only you can describe your pain. A doctor can suggest or prescribe many types of medicines for pain. These range from over-the-counter medicines like acetaminophen (Tylenol) to powerful medicines called opioids. Examples of opioids are fentanyl, hydrocodone, morphine, and oxycodone. Heroin is an illegal opioid  Opioids are strong medicines. They can help you manage pain when you use them the right way. But if you misuse them, they can cause serious harm and even death. For these reasons, doctors are very careful about how they prescribe opioids. If you decide to take opioids, here are some things to remember. · Keep your doctor informed. You can get addicted to opioids. The risk is higher if you have a history of substance use. Your doctor will monitor you closely for signs of misuse and addiction and to figure out when you no longer need to take opioids. · Make a treatment plan. The goal of your plan is to be able to function and do the things you need to do, even if you still have some pain. You might be able to manage your pain with other non-opioid options like physical therapy, relaxation, or over-the-counter pain medicines. · Be aware of the side effects. Opioids can cause serious side effects, such as constipation, dry mouth, and nausea. And over time, you may need a higher dose to get pain relief. This is called tolerance. Your body also gets used to opioids. This is called physical dependence. If you suddenly stop taking them, you may have withdrawal symptoms.   The doctor carefully considered what pain medicine is right for you. You may not have received opioids if your doctor was concerned about drug interactions or your safety, or if he or she had other concerns. It is best to have one doctor or clinic treat your pain. This way you will get the pain medicine that will help you the most. And a doctor will be able to watch for any problems that the medicine might cause. The doctor has checked you carefully, but problems can develop later. If you notice any problems or new symptoms,  get medical treatment right away. Follow-up care is a key part of your treatment and safety. Be sure to make and go to all appointments, and call your doctor if you are having problems. It's also a good idea to know your test results and keep a list of the medicines you take. How can you care for yourself at home? · If you need to take opioids to manage your pain, remember these safety tips. ¨ Follow directions carefully. It's easy to misuse opioids if you take a dose other than what's prescribed by your doctor. This can lead to overdose and even death. Even sharing them with someone they weren't meant for is misuse. ¨ Be cautious. Opioids may affect your judgment and decision making. Do not drive or operate machinery until you can think clearly. Talk with your doctor about when it is safe to drive. ¨ Reduce the risk of drug interactions. Opioids can be dangerous if you take them with alcohol or with certain drugs like sleeping pills and muscle relaxers. Make sure your doctor knows about all the other medicines you take, including over-the-counter medicines. Don't start any new medicines before you talk to your doctor or pharmacist.  Waqas Neal Keep others safe. Store opioids in a safe and secure place. Make sure that pets, children, friends, and family can't get to them. When you're done using opioids, make sure to properly dispose of them.  You can either use a community drug take-back program or your drugstore's mail-back program. If one of these programs isn't available, you can flush opioid skin patches and unused opioid pills down the toilet. ¨ Reduce the risk of overdose. Misuse of opioids can be very dangerous. Protect yourself by asking your doctor about a naloxone rescue kit. It can help you-and even save your life-if you take too much of an opioid. · Try other ways to reduce pain. ¨ Relax, and reduce stress. Relaxation techniques such as deep breathing or meditation can help. ¨ Keep moving. Gentle, daily exercise can help reduce pain over the long run. Try low- or no-impact exercises such as walking, swimming, and stationary biking. Do stretches to stay flexible. ¨ Try heat, cold packs, and massage. ¨ Get enough sleep. Pain can make you tired and drain your energy. Talk with your doctor if you have trouble sleeping because of pain. ¨ Think positive. Your thoughts can affect your pain level. Do things that you enjoy to distract yourself when you have pain instead of focusing on the pain. See a movie, read a book, listen to music, or spend time with a friend. · If you are not taking a prescription pain medicine, ask your doctor if you can take an over-the-counter medicine. When should you call for help? Call your doctor now or seek immediate medical care if:    · You have a new kind of pain.     · You have new symptoms, such as a fever or rash, along with the pain.    Watch closely for changes in your health, and be sure to contact your doctor if:    · You think you might be using too much pain medicine, and you need help to use less or stop.     · Your pain gets worse.     · You would like a referral to a doctor or clinic that specializes in pain management. Where can you learn more? Go to http://christiane-maryanne.info/. Enter R108 in the search box to learn more about \"Safe Use of Opioid Pain Medicine: Care Instructions. \"  Current as of: September 10, 2017  Content Version: 11.7  © 6498-3266 [employer]. Care instructions adapted under license by [employer] (which disclaims liability or warranty for this information). If you have questions about a medical condition or this instruction, always ask your healthcare professional. [address] any warranty or liability for your use of this information.

## 2018-07-23 NOTE — PROGRESS NOTES
Referral date October 2011, source primary care provider and question low back pain. HPI:  Lucía Hernandez is a 46 y.o. female here for f/u visit for ongoing evaluation of chronic low back pain. Pt was last seen here on 06/05/2018. Pt denies interval changes on the character or distribution of pain. Pain is located lower back. The pain is described as achy and pulling. Pain at its best is 5/10. Pain at its worse is 8/10. The pain is worsened by prolonged walking, bending, and lifting. Symptoms are relieved by sleeping, jacuzzu. Pt unable to see chiropractor because the two places she called do not take her insurance and she cannot afford it. Patient recently went to the ED for a shot in her back. Pt is using her TENS unit which is helping her. Patient discontinued the fentanyl 75 mcg/hr on her own, she does admit to increased pain. Social History     Social History    Marital status:      Spouse name: N/A    Number of children: N/A    Years of education: N/A     Occupational History    Not on file.      Social History Main Topics    Smoking status: Never Smoker    Smokeless tobacco: Never Used    Alcohol use No    Drug use: No    Sexual activity: Yes     Birth control/ protection: Surgical      Comment: Hysterectomy     Other Topics Concern    Not on file     Social History Narrative     Family History   Problem Relation Age of Onset    Cancer Other      NOS    Heart Disease Other      NOS    Stroke Father     Heart Attack Father     Breast Cancer Mother      Allergies   Allergen Reactions    Latex Rash    Ibuprofen Nausea and Vomiting     Past Medical History:   Diagnosis Date    Anemia     Chronic low back pain     Constipation     Dysmenorrhea     Eczema     Fatigue     Insomnia     Iron deficiency anemia     Neurological disorder     pain, difficulty writing    Secondary thrombocytosis      Past Surgical History:   Procedure Laterality Date    HX ABDOMINOPLASTY      HX BREAST REDUCTION      HX GASTRIC BYPASS  1997    HX HYSTERECTOMY      HX HYSTERECTOMY  2012    HX TUBAL LIGATION      bilateral     Current Outpatient Prescriptions on File Prior to Visit   Medication Sig    amLODIPine (NORVASC) 5 mg tablet Take 5 mg by mouth daily.  dextroamphetamine-amphetamine (ADDERALL) 10 mg tablet Take 30 mg by mouth two (2) times a day.  docusate sodium (COLACE) 100 mg capsule Take 1 Cap by mouth two (2) times a day for 90 days. (Patient taking differently: Take 100 mg by mouth two (2) times daily as needed.)    senna-docusate (PERICOLACE) 8.6-50 mg per tablet 1 tablet p.o. daily as needed for constipation    baclofen (LIORESAL) 10 mg tablet Take 1 Tab by mouth three (3) times daily as needed.  Back Brace misc 1 Units by Does Not Apply route daily. As directed    sertraline (ZOLOFT) 50 mg tablet Take 50 mg by mouth daily.  TENS Units (TENS 48) jazzmine 29-year-old female with chronic lower back pain. Please provide her with a TENS unit to use to help improve her function, improve quality of life, decrease pain, and decreased medication usage. Thank you    naloxone 4 mg/actuation spry 4 mg by Nasal route as needed. No current facility-administered medications on file prior to visit. ROS:  Denies fever, chills, nausea, vomiting, diarrhea, constipation, abdominal pain, chest pain, shortness or breath/trouble breathing, weakness, trouble swallowing, changes in vision, changes in hearing, falls, dizziness, bladder incontinence, bowel incontinence, depression, anxiety, suicidal ideations, homicidal ideations or alcohol use. Positive findings include constipation. Opioid specific risk:  Concurrent benzodiazepine use, history of morbid obesity, history of depression, long term opioid therapy without breaks. Opioid specific history:  continuous opioid therapy without breaks since at least 2011.     Vitals:    07/23/18 0801   BP: 150/86   Pulse: 75 Resp: 13   Temp: 95.9 °F (35.5 °C)   TempSrc: Oral   Weight: 68.5 kg (151 lb)   Height: 5' 4\" (1.626 m)   PainSc:   6   PainLoc: Back        Imaging:  \"\"\"\"\" 9/22/14 x-ray spine lumbar 2 or 3 view IMPRESSION:  Moderate discogenic degenerative disease L3-4 and L4-5 levels. Similar prior  exam 08/10/2013. Stable appearance of mild thoracolumbar dextro scoliosis\"\"\"\"\"    Physical exam:  AFVS elevated BP, no acute distress, overweight body habitus. A&OXs 3. Normocephalic, atraumatic. Conjugate gaze, clear sclerae. Speech is clear and appropriate. Mood is appropriate for situation. Non-labored breathing. Lungs CTA B/L. No wheezing, rales or rhonchi. Heart RRR with S1 and S2 noted. No murmurs. Thoracic, lumbar spine TTP. Bilateral SIJ, right piriformis TTP. Gait and balance are within functional limits. LE:   Strength for right lower extremity is 5/5 for hip flexion, knee extension, dorsiflexion, extensor hallucis longus, plantar flexion. Strength for left lower extremity is 5/5 for hip flexion, knee extension, dorsiflexion, extensor hallucis longus, plantar flexion. Sensation to light touch is intact for right L2-S2. Sensation to light touch is intact for left L2-S2. Muscle Stretch reflexes for right lower extremity are absent for L4, S1.   Muscle Stretch reflexes for left  lower extremity are absent for L4, S1. Full AROM lumbar flexion decreased by 50% to endrange  reproduction of primary pain. Full AROM lumbar extension decreased by 50% to endrange reproduction of primary pain. Negative seated straight leg raise bilaterally. Negative supine straight leg raise bilaterally. Negative Stinchfield's on the right and a left. Negative FABERs on the right and the left. Negative FADIRS on the right and the left.          Calculated MEQ - 60  Naloxone rescue - yes  Prophylactic bowel program - yes  Date of last OCA 11/20/17  Last UDS 05/10/18, not consistent   date checked today, findings consistent    Primary Care Physician  Jaqueline Moya  [address]  [phone]    Today   Last Visit  PEREZ -48%  48%  PGIC -6 and 2  7 and 2  COMM -0  0    PHQ -- . PHQ over the last two weeks 7/23/2018   PHQ Not Done -   Little interest or pleasure in doing things Not at all   Feeling down, depressed, irritable, or hopeless Not at all   Total Score PHQ 2 0       DSM V-OUD Screen - deferred to next visit     Assessment/Plan:     ICD-10-CM ICD-9-CM    1. Chronic bilateral low back pain with bilateral sciatica M54.42 724.2 oxyCODONE-acetaminophen (PERCOCET 10)  mg per tablet    M54.41 724.3     G89.29 338.29    2. Chronic pain syndrome G89.4 338.4 oxyCODONE-acetaminophen (PERCOCET 10)  mg per tablet   3. Encounter for long-term (current) use of high-risk medication Z79.899 V58.69    4. Spondylosis of lumbar spine M47.816 721.3    5. Piriformis muscle pain M79.1 729.1    6. Sacroiliitis ([facility] [address] M46.1 720.2         Patient DC'd fentanyl patches on her own, she had mild side effects of diarrhea and increased pain. Patient given information on [facility] chiropractic which is cheaper than her current insurance, she will investigate and obtain care there on her own. Patient given piriformis muscle stretches handout to be performed at home on her own. Patient to continue to use TENS unit as she states it is helping her. Do not recommend long term opioid therapy for this patient. Pt currently taking oxycodone 10/325 take 1 tab up to 4 times a day as needed for pain with MME of 60. Darin Subha will hold off on the weaning of opioid medication, patient DC'd her fentanyl 75 mcg/h patch on her own so to make up for the drop of MME today, pt given prescription for oxycodone 10/325 mg tablet take 1 tablet up to 5 times a day as needed for pain with a total of 140 tablets given to be budgeted over 30 days.     At next office visit, the plan is to decrease oxycodone 10/325 mg tablet take 1 tablet up to 4 times a day as needed for pain. Their new MME will be 60. If patient has difficulty with the wean or difficulty with cravings we will consider referral to mental health for ongoing assessment and treatment for opioid use disorder. Consider diagnostic bilateral SIJ injections    Follow up ongoing assessment and ongoing development of integrative and comprehensive plan of care for chronic pain. Goals: To establish complementary and integrative plan of care to address chronic pain issues while minimizing pharmaceuticals to maximize patient's function improve quality of life. Education:  Patient again educated on the importance of strict compliance with the opioid care agreement while on opioid therapy. Patient also again educated that they should avoid driving while on chronic opioid therapy. Also advised to avoid alcohol and to avoid benzodiazepines while on opioid therapy. Handouts given regarding opioid safety and sleep health. Patient Homework:  Continue to independently investigate chiropractic. Follow-up Disposition:  Return in about 9 weeks (around 9/24/2018) for 30 mins.

## 2018-07-23 NOTE — PROGRESS NOTES
Nursing Notes    Patient presents to the office today in follow-up. Patient rates her pain at 6/10 on the numerical pain scale. Reviewed medications with counts as follows:    Rx Date filled Qty Dispensed Pill Count Last Dose Short   Fentanyl 75 mcg/hr - pt states that she has not had a patch on in about 2 weeks. She states that she decided to come off of the medication. She did not bring the medication with her to the appt. 07/02/18 10      Percocet 10/325 mg - pt admits to taking 5 a day since she stopped wearing her patch 07/05/18 120 28 This a.m. 5 days                         POC UDS was not performed in office today    Any new labs or imaging since last appointment? NO    Have you been to an emergency room (ER) or urgent care clinic since your last visit? YES. Pt went to the ER for severe back pain            Have you been hospitalized since your last visit? NO     If yes, where, when, and reason for visit? Have you seen or consulted any other health care providers outside of the [facility]  since your last visit? NO     If yes, where, when, and reason for visit? Ms. Lillian Reyna has a reminder for a \"due or due soon\" health maintenance. I have asked that she contact her primary care provider for follow-up on this health maintenance.     PHQ over the last two weeks 7/23/2018   PHQ Not Done -   Little interest or pleasure in doing things Not at all   Feeling down, depressed, irritable, or hopeless Not at all   Total Score PHQ 2 0

## 2018-08-07 ENCOUNTER — TELEPHONE (OUTPATIENT)
Dept: PAIN MANAGEMENT | Age: 48
End: 2018-08-07

## 2018-08-07 NOTE — TELEPHONE ENCOUNTER
Patient called requesting something to help her sleep at night . Spoke with Dr. Tee Tapia he had given her handouts on sleep and recommend she talk with her PCP about a sleep study workup. No medications to be given at this time for sleep .

## 2018-08-08 DIAGNOSIS — Z79.899 ENCOUNTER FOR LONG-TERM (CURRENT) USE OF HIGH-RISK MEDICATION: ICD-10-CM

## 2018-08-09 RX ORDER — ETHYL ALCOHOL 62 %
GEL (ML) TOPICAL
Qty: 90 TAB | Refills: 3 | Status: SHIPPED | OUTPATIENT
Start: 2018-08-09 | End: 2019-03-05

## 2018-08-27 DIAGNOSIS — G89.4 CHRONIC PAIN SYNDROME: ICD-10-CM

## 2018-08-27 DIAGNOSIS — G89.29 CHRONIC BILATERAL LOW BACK PAIN WITH BILATERAL SCIATICA: ICD-10-CM

## 2018-08-27 DIAGNOSIS — M54.41 CHRONIC BILATERAL LOW BACK PAIN WITH BILATERAL SCIATICA: ICD-10-CM

## 2018-08-27 DIAGNOSIS — M54.42 CHRONIC BILATERAL LOW BACK PAIN WITH BILATERAL SCIATICA: ICD-10-CM

## 2018-08-27 NOTE — TELEPHONE ENCOUNTER
Patient calling about her medication(Percocet. She stated that she called last Monday and Friday.     Patient stated her medication ended 08/26/18.    100% relief  Daily tasks - yes    No side effects

## 2018-08-28 RX ORDER — OXYCODONE AND ACETAMINOPHEN 10; 325 MG/1; MG/1
TABLET ORAL
Qty: 130 TAB | Refills: 0 | Status: SHIPPED | OUTPATIENT
Start: 2018-08-28 | End: 2019-03-05

## 2018-08-28 NOTE — TELEPHONE ENCOUNTER
Yuli Byrd has called requesting a refill of their controlled medication, Percocet  mg, for the management of Chronic bilateral low back pain with bilateral sciatica. Last office visit date: 7/23/18    Date last  was pulled and reviewed : 8/28/18 and compliant. Last filled Unable to see    Was the patient compliant when the above report was pulled? yes    Analgesia: Patient report 100% of pain relief with current medication regimen    Aberrancies: 1 aberrancies in the last 30 days (The pt is 5 days short on her percocet. She does admit to self increasing her medication because she stopped wearing her fentanyl patches. Pt was counseled on this and she verbalized understanding. The provider was made aware.)    ADL's: Patient report she is able to do basic ADL's at home. Adverse Reaction:Patient report no adverse reaction. Provider's last note and plan of care reviewed? yes  Request forwarded to provider for review. Provider was made aware.

## 2019-03-05 ENCOUNTER — HOSPITAL ENCOUNTER (EMERGENCY)
Age: 49
Discharge: HOME | End: 2019-03-05
Attending: EMERGENCY MEDICINE
Payer: MEDICARE

## 2019-03-05 VITALS
BODY MASS INDEX: 26.33 KG/M2 | OXYGEN SATURATION: 98 % | HEART RATE: 70 BPM | SYSTOLIC BLOOD PRESSURE: 131 MMHG | TEMPERATURE: 98 F | DIASTOLIC BLOOD PRESSURE: 81 MMHG | HEIGHT: 65 IN | WEIGHT: 158 LBS | RESPIRATION RATE: 14 BRPM

## 2019-03-05 DIAGNOSIS — N30.00 ACUTE CYSTITIS WITHOUT HEMATURIA: Primary | ICD-10-CM

## 2019-03-05 DIAGNOSIS — R10.13 ABDOMINAL PAIN, EPIGASTRIC: ICD-10-CM

## 2019-03-05 LAB
ALBUMIN SERPL-MCNC: 3.9 G/DL (ref 3.4–5)
ALBUMIN/GLOB SERPL: 1 {RATIO} (ref 0.8–1.7)
ALP SERPL-CCNC: 93 U/L (ref 45–117)
ALT SERPL-CCNC: 21 U/L (ref 13–56)
ANION GAP SERPL CALC-SCNC: 7 MMOL/L (ref 3–18)
APPEARANCE UR: ABNORMAL
AST SERPL-CCNC: 18 U/L (ref 15–37)
BACTERIA URNS QL MICRO: ABNORMAL /HPF
BASOPHILS # BLD: 0 K/UL (ref 0–0.1)
BASOPHILS NFR BLD: 0 % (ref 0–2)
BILIRUB SERPL-MCNC: 0.3 MG/DL (ref 0.2–1)
BILIRUB UR QL: NEGATIVE
BUN SERPL-MCNC: 10 MG/DL (ref 7–18)
BUN/CREAT SERPL: 14 (ref 12–20)
CALCIUM SERPL-MCNC: 9.4 MG/DL (ref 8.5–10.1)
CHLORIDE SERPL-SCNC: 100 MMOL/L (ref 100–108)
CO2 SERPL-SCNC: 31 MMOL/L (ref 21–32)
COLOR UR: YELLOW
CREAT SERPL-MCNC: 0.74 MG/DL (ref 0.6–1.3)
DIFFERENTIAL METHOD BLD: ABNORMAL
EOSINOPHIL # BLD: 0.2 K/UL (ref 0–0.4)
EOSINOPHIL NFR BLD: 2 % (ref 0–5)
EPITH CASTS URNS QL MICRO: ABNORMAL /LPF (ref 0–5)
ERYTHROCYTE [DISTWIDTH] IN BLOOD BY AUTOMATED COUNT: 14.7 % (ref 11.6–14.5)
GLOBULIN SER CALC-MCNC: 3.9 G/DL (ref 2–4)
GLUCOSE SERPL-MCNC: 91 MG/DL (ref 74–99)
GLUCOSE UR STRIP.AUTO-MCNC: NEGATIVE MG/DL
HCT VFR BLD AUTO: 38.6 % (ref 35–45)
HGB BLD-MCNC: 12.3 G/DL (ref 12–16)
HGB UR QL STRIP: NEGATIVE
KETONES UR QL STRIP.AUTO: NEGATIVE MG/DL
LEUKOCYTE ESTERASE UR QL STRIP.AUTO: ABNORMAL
LIPASE SERPL-CCNC: 140 U/L (ref 73–393)
LYMPHOCYTES # BLD: 1.6 K/UL (ref 0.9–3.6)
LYMPHOCYTES NFR BLD: 20 % (ref 21–52)
MCH RBC QN AUTO: 27.7 PG (ref 24–34)
MCHC RBC AUTO-ENTMCNC: 31.9 G/DL (ref 31–37)
MCV RBC AUTO: 86.9 FL (ref 74–97)
MONOCYTES # BLD: 0.4 K/UL (ref 0.05–1.2)
MONOCYTES NFR BLD: 4 % (ref 3–10)
NEUTS SEG # BLD: 5.8 K/UL (ref 1.8–8)
NEUTS SEG NFR BLD: 74 % (ref 40–73)
NITRITE UR QL STRIP.AUTO: POSITIVE
PH UR STRIP: 7.5 [PH] (ref 5–8)
PLATELET # BLD AUTO: 378 K/UL (ref 135–420)
PMV BLD AUTO: 8.6 FL (ref 9.2–11.8)
POTASSIUM SERPL-SCNC: 4.2 MMOL/L (ref 3.5–5.5)
PROT SERPL-MCNC: 7.8 G/DL (ref 6.4–8.2)
PROT UR STRIP-MCNC: NEGATIVE MG/DL
RBC # BLD AUTO: 4.44 M/UL (ref 4.2–5.3)
RBC #/AREA URNS HPF: NEGATIVE /HPF (ref 0–5)
SODIUM SERPL-SCNC: 138 MMOL/L (ref 136–145)
SP GR UR REFRACTOMETRY: 1.01 (ref 1–1.03)
UROBILINOGEN UR QL STRIP.AUTO: 1 EU/DL (ref 0.2–1)
WBC # BLD AUTO: 7.9 K/UL (ref 4.6–13.2)
WBC URNS QL MICRO: ABNORMAL /HPF (ref 0–4)

## 2019-03-05 PROCEDURE — 81001 URINALYSIS AUTO W/SCOPE: CPT

## 2019-03-05 PROCEDURE — 85025 COMPLETE CBC W/AUTO DIFF WBC: CPT

## 2019-03-05 PROCEDURE — 83690 ASSAY OF LIPASE: CPT

## 2019-03-05 PROCEDURE — 80053 COMPREHEN METABOLIC PANEL: CPT

## 2019-03-05 PROCEDURE — 99282 EMERGENCY DEPT VISIT SF MDM: CPT

## 2019-03-05 RX ORDER — CLONIDINE HYDROCHLORIDE 0.1 MG/1
TABLET ORAL
COMMUNITY
Start: 2019-02-28

## 2019-03-05 RX ORDER — NITROFURANTOIN 25; 75 MG/1; MG/1
100 CAPSULE ORAL 2 TIMES DAILY
Qty: 20 CAP | Refills: 0 | Status: SHIPPED | OUTPATIENT
Start: 2019-03-05 | End: 2019-03-15

## 2019-03-05 RX ORDER — OXYCODONE AND ACETAMINOPHEN 10; 325 MG/1; MG/1
TABLET ORAL
COMMUNITY
Start: 2019-02-24 | End: 2021-05-27

## 2019-03-05 RX ORDER — TRIAMTERENE AND HYDROCHLOROTHIAZIDE 37.5; 25 MG/1; MG/1
CAPSULE ORAL
Refills: 0 | COMMUNITY
Start: 2019-01-06 | End: 2020-12-17

## 2019-03-05 RX ORDER — LISINOPRIL 10 MG/1
40 TABLET ORAL
COMMUNITY
Start: 2018-08-28

## 2019-03-05 RX ORDER — BACLOFEN 10 MG/1
TABLET ORAL
Refills: 0 | COMMUNITY
Start: 2018-12-13 | End: 2021-05-27

## 2019-03-05 NOTE — ED NOTES
Grace Raines is a 46 y.o. female that was discharged in good condition. The patients diagnosis, condition and treatment were explained to  patient and aftercare instructions were given. The patient verbalized understanding. Patient armband removed and shredded.

## 2019-03-05 NOTE — ED TRIAGE NOTES
C/O abdominal pain x 3 days. Denies N/V/D, urinary symptoms, or vaginal bleeding/discharge. Pt states that she was seen at [facility] ED 2 months ago for same. Pt also c/o pain to 2nd digit of left foot x 2 weeks. Denies injury. Pt states that she had reconstructive surgery on left foot January 2018.

## 2019-03-05 NOTE — ED PROVIDER NOTES
EMERGENCY DEPARTMENT HISTORY AND PHYSICAL EXAM    10:35 AM      Date: 3/5/2019  Patient Name: James Guallpa    History of Presenting Illness     Chief Complaint   Patient presents with    Abdominal Pain    Toe Pain         History Provided By: Patient    Chief Complaint: Abd pain  Duration:  1.5 months  Timing:  Intermittent  Location: Right-sided Abd  Quality: Sharp  Severity: NA  Modifying Factors: Unchanged by eating  Associated Symptoms: Denies n/v/d and constipation      Additional History (Context): 10:35 AM James Guallpa is a 46 y.o. female with h/o anemia and gastric bypass who presents to ED complaining of intermittent sharp right-sided Abd pain onset for 1.5 months. Was seen 3 times at [place] [id number] for these Sx. At her first visit 1.5 months ago, she was told she had a UTI and was given Abx but did not get better. Was seen 2 weeks later and told she had a blood infection but did not get better. Was seen 2 weeks ago and was told she had a kidney infection, but her Sx have not improved. Her last episode of this pain was this AM. Also complains of toe pain caused by a callous. Denies any further complaints or symptoms at the moment. PCP: Makayla Chen MD    Current Outpatient Medications   Medication Sig Dispense Refill    lisinopril (PRINIVIL, ZESTRIL) 10 mg tablet Take 40 mg by mouth.  baclofen (LIORESAL) 10 mg tablet   0    cloNIDine HCl (CATAPRES) 0.1 mg tablet       oxyCODONE-acetaminophen (PERCOCET 10)  mg per tablet       triamterene-hydroCHLOROthiazide (DYAZIDE) 37.5-25 mg per capsule   0    dextroamphetamine-amphetamine (ADDERALL) 10 mg tablet Take 30 mg by mouth two (2) times a day.  TENS Units (TENS 48) jazzmine 41-year-old female with chronic lower back pain. Please provide her with a TENS unit to use to help improve her function, improve quality of life, decrease pain, and decreased medication usage.   Thank you 1 Device 0    Back Brace misc 1 Units by Does Not Apply route daily. As directed 1 Device 1       Past History     Past Medical History:  Past Medical History:   Diagnosis Date    Anemia     Chronic low back pain     Constipation     Dysmenorrhea     Eczema     Fatigue     Insomnia     Iron deficiency anemia     Neurological disorder     pain, difficulty writing    Secondary thrombocytosis        Past Surgical History:  Past Surgical History:   Procedure Laterality Date    HX ABDOMINOPLASTY      HX BREAST REDUCTION      HX GASTRIC BYPASS  1997    HX HYSTERECTOMY      HX HYSTERECTOMY  2012    HX ORTHOPAEDIC      left foot reconstruction     HX TUBAL LIGATION      bilateral       Family History:  Family History   Problem Relation Age of Onset    Cancer Other         NOS    Heart Disease Other         NOS    Stroke Father     Heart Attack Father     Breast Cancer Mother        Social History:  Social History     Tobacco Use    Smoking status: Never Smoker    Smokeless tobacco: Never Used   Substance Use Topics    Alcohol use: No    Drug use: No       Allergies: Allergies   Allergen Reactions    Latex Rash    Ibuprofen Not Reported This Time         Review of Systems     Review of Systems   Constitutional: Negative for activity change, fatigue and fever. HENT: Negative for congestion and rhinorrhea. Eyes: Negative for visual disturbance. Respiratory: Negative for shortness of breath. Cardiovascular: Negative for chest pain and palpitations. Gastrointestinal: Positive for abdominal pain. Negative for constipation, diarrhea, nausea and vomiting. Genitourinary: Negative for dysuria and hematuria. Musculoskeletal: Negative for back pain. Positive for toe pain   Skin: Negative for rash. Neurological: Negative for dizziness, weakness and light-headedness. All other systems reviewed and are negative.         Physical Exam     Visit Vitals  /84 (BP 1 Location: Left arm, BP Patient Position: Sitting) Pulse 70   Temp 98 °F (36.7 °C)   Resp 14   Ht 5' 4.5\" (1.638 m)   Wt 71.7 kg (158 lb)   SpO2 100%   BMI 26.70 kg/m²       Physical Exam   Constitutional: She is oriented to person, place, and time. She appears well-developed and well-nourished. No distress. HENT:   Head: Normocephalic and atraumatic. Right Ear: External ear normal.   Left Ear: External ear normal.   Nose: Nose normal.   Mouth/Throat: Oropharynx is clear and moist.   Eyes: Conjunctivae and EOM are normal. Pupils are equal, round, and reactive to light. No scleral icterus. Neck: Normal range of motion. Neck supple. No JVD present. No tracheal deviation present. No thyromegaly present. Cardiovascular: Normal rate, regular rhythm, normal heart sounds and intact distal pulses. Exam reveals no gallop and no friction rub. No murmur heard. Pulmonary/Chest: Effort normal and breath sounds normal. She exhibits no tenderness. Abdominal: Soft. Bowel sounds are normal. She exhibits no distension. There is no tenderness. There is no rebound and no guarding. Musculoskeletal: Normal range of motion. She exhibits no edema or tenderness. Lymphadenopathy:     She has no cervical adenopathy. Neurological: She is alert and oriented to person, place, and time. No cranial nerve deficit. Coordination normal.   No sensory loss, Gait normal, Motor 5/5   Skin: Skin is warm and dry. Psychiatric: She has a normal mood and affect. Her behavior is normal. Judgment and thought content normal.   Nursing note and vitals reviewed. Diagnostic Study Results     Labs -  No results found for this or any previous visit (from the past 12 hour(s)). Radiologic Studies -   No orders to display     No results found. Medical Decision Making   I am the first provider for this patient. I reviewed the vital signs, available nursing notes, past medical history, past surgical history, family history and social history.     Vital Signs-Reviewed the patient's vital signs. Pulse Oximetry Analysis -  Patient is 100% O2 on RA, indicating adequate oxygenation. Cardiac Monitor:  Rate: 70 bpm    Records Reviewed: Old medical records and nursing notes (Time of Review: 10:35 AM)    ED Course: Progress Notes, Reevaluation, and Consults:      Provider Notes (Medical Decision Making): Results reviwed with pt who agrees with dispo and F/U plan. Huan Haynes MD  12:10 PM        Diagnosis     Clinical Impression: No diagnosis found. Disposition: DC    Follow-up Information    None             Medication List      ASK your doctor about these medications    ADDERALL 10 mg tablet  Generic drug:  dextroamphetamine-amphetamine     Back Brace Misc  1 Units by Does Not Apply route daily. As directed     baclofen 10 mg tablet  Commonly known as:  LIORESAL     cloNIDine HCl 0.1 mg tablet  Commonly known as:  CATAPRES     lisinopril 10 mg tablet  Commonly known as:  PRINIVIL, ZESTRIL     oxyCODONE-acetaminophen  mg per tablet  Commonly known as:  PERCOCET 10     TENS Units Kajal  Commonly known as:  TENS 48  42-year-old female with chronic lower back pain. Please provide her with a TENS unit to use to help improve her function, improve quality of life, decrease pain, and decreased medication usage. Thank you     triamterene-hydroCHLOROthiazide 37.5-25 mg per capsule  Commonly known as:  [employer]ura Life          _______________________________    Attestations:  Scribe Attestation     Sanketbacilio Chow acting as a scribe for and in the presence of Ricardo Haddad MD      March 05, 2019 at 10:35 AM       Provider Attestation:      I personally performed the services described in the documentation, reviewed the documentation, as recorded by the scribe in my presence, and it accurately and completely records my words and actions.  March 05, 2019 at 10:35 AM - Ricardo Haddad MD    _______________________________

## 2019-03-05 NOTE — DISCHARGE INSTRUCTIONS
Patient Education        Urinary Tract Infection in Women: Care Instructions  Your Care Instructions    A urinary tract infection, or UTI, is a general term for an infection anywhere between the kidneys and the urethra (where urine comes out). Most UTIs are bladder infections. They often cause pain or burning when you urinate. UTIs are caused by bacteria and can be cured with antibiotics. Be sure to complete your treatment so that the infection goes away. Follow-up care is a key part of your treatment and safety. Be sure to make and go to all appointments, and call your doctor if you are having problems. It's also a good idea to know your test results and keep a list of the medicines you take. How can you care for yourself at home? · Take your antibiotics as directed. Do not stop taking them just because you feel better. You need to take the full course of antibiotics. · Drink extra water and other fluids for the next day or two. This may help wash out the bacteria that are causing the infection. (If you have kidney, heart, or liver disease and have to limit fluids, talk with your doctor before you increase your fluid intake.)  · Avoid drinks that are carbonated or have caffeine. They can irritate the bladder. · Urinate often. Try to empty your bladder each time. · To relieve pain, take a hot bath or lay a heating pad set on low over your lower belly or genital area. Never go to sleep with a heating pad in place. To prevent UTIs  · Drink plenty of water each day. This helps you urinate often, which clears bacteria from your system. (If you have kidney, heart, or liver disease and have to limit fluids, talk with your doctor before you increase your fluid intake.)  · Urinate when you need to. · Urinate right after you have sex. · Change sanitary pads often. · Avoid douches, bubble baths, feminine hygiene sprays, and other feminine hygiene products that have deodorants.   · After going to the bathroom, wipe from front to back. When should you call for help? Call your doctor now or seek immediate medical care if:    · Symptoms such as fever, chills, nausea, or vomiting get worse or appear for the first time.     · You have new pain in your back just below your rib cage. This is called flank pain.     · There is new blood or pus in your urine.     · You have any problems with your antibiotic medicine.    Watch closely for changes in your health, and be sure to contact your doctor if:    · You are not getting better after taking an antibiotic for 2 days.     · Your symptoms go away but then come back. Where can you learn more? Go to http://christiane-maryanne.info/. Enter N696 in the search box to learn more about \"Urinary Tract Infection in Women: Care Instructions. \"  Current as of: March 20, 2018  Content Version: 11.9  © 8336-5988 [employer]. Care instructions adapted under license by [employer] (which disclaims liability or warranty for this information). If you have questions about a medical condition or this instruction, always ask your healthcare professional. [place] [address] any warranty or liability for your use of this information. Patient Education        Abdominal Pain: Care Instructions  Your Care Instructions    Abdominal pain has many possible causes. Some aren't serious and get better on their own in a few days. Others need more testing and treatment. If your pain continues or gets worse, you need to be rechecked and may need more tests to find out what is wrong. You may need surgery to correct the problem. Don't ignore new symptoms, such as fever, nausea and vomiting, urination problems, pain that gets worse, and dizziness. These may be signs of a more serious problem. Your doctor may have recommended a follow-up visit in the next 8 to 12 hours. If you are not getting better, you may need more tests or treatment.   The doctor has checked you carefully, but problems can develop later. If you notice any problems or new symptoms, get medical treatment right away. Follow-up care is a key part of your treatment and safety. Be sure to make and go to all appointments, and call your doctor if you are having problems. It's also a good idea to know your test results and keep a list of the medicines you take. How can you care for yourself at home? · Rest until you feel better. · To prevent dehydration, drink plenty of fluids, enough so that your urine is light yellow or clear like water. Choose water and other caffeine-free clear liquids until you feel better. If you have kidney, heart, or liver disease and have to limit fluids, talk with your doctor before you increase the amount of fluids you drink. · If your stomach is upset, eat mild foods, such as rice, dry toast or crackers, bananas, and applesauce. Try eating several small meals instead of two or three large ones. · Wait until 48 hours after all symptoms have gone away before you have spicy foods, alcohol, and drinks that contain caffeine. · Do not eat foods that are high in fat. · Avoid anti-inflammatory medicines such as aspirin, ibuprofen (Advil, Motrin), and naproxen (Aleve). These can cause stomach upset. Talk to your doctor if you take daily aspirin for another health problem. When should you call for help? Call 911 anytime you think you may need emergency care.  For example, call if:    · You passed out (lost consciousness).     · You pass maroon or very bloody stools.     · You vomit blood or what looks like coffee grounds.     · You have new, severe belly pain.    Call your doctor now or seek immediate medical care if:    · Your pain gets worse, especially if it becomes focused in one area of your belly.     · You have a new or higher fever.     · Your stools are black and look like tar, or they have streaks of blood.     · You have unexpected vaginal bleeding.     · You have symptoms of a urinary tract infection. These may include:  ? Pain when you urinate. ? Urinating more often than usual.  ? Blood in your urine.     · You are dizzy or lightheaded, or you feel like you may faint.    Watch closely for changes in your health, and be sure to contact your doctor if:    · You are not getting better after 1 day (24 hours). Where can you learn more? Go to http://christiane-maryanne.info/. Enter R753 in the search box to learn more about \"Abdominal Pain: Care Instructions. \"  Current as of: September 23, 2018  Content Version: 11.9  © 3023-4546 [employer]. Care instructions adapted under license by [employer] (which disclaims liability or warranty for this information). If you have questions about a medical condition or this instruction, always ask your healthcare professional. [address] any warranty or liability for your use of this information.

## 2020-12-17 ENCOUNTER — APPOINTMENT (OUTPATIENT)
Dept: CT IMAGING | Age: 50
End: 2020-12-17
Attending: EMERGENCY MEDICINE
Payer: MEDICARE

## 2020-12-17 ENCOUNTER — HOSPITAL ENCOUNTER (EMERGENCY)
Age: 50
Discharge: HOME | End: 2020-12-17
Attending: EMERGENCY MEDICINE
Payer: MEDICARE

## 2020-12-17 VITALS
SYSTOLIC BLOOD PRESSURE: 128 MMHG | RESPIRATION RATE: 16 BRPM | OXYGEN SATURATION: 98 % | DIASTOLIC BLOOD PRESSURE: 82 MMHG | HEART RATE: 63 BPM | TEMPERATURE: 98.5 F

## 2020-12-17 DIAGNOSIS — Z98.84 HISTORY OF ROUX-EN-Y GASTRIC BYPASS: ICD-10-CM

## 2020-12-17 DIAGNOSIS — R10.13 EPIGASTRIC PAIN: Primary | ICD-10-CM

## 2020-12-17 LAB
ALBUMIN SERPL-MCNC: 4.5 G/DL (ref 3.5–4.7)
ALBUMIN/GLOB SERPL: 1.1 {RATIO}
ALP SERPL-CCNC: 103 U/L (ref 38–126)
ALT SERPL-CCNC: 20 U/L (ref 3–52)
ANION GAP SERPL CALC-SCNC: 7 MMOL/L
AST SERPL W P-5'-P-CCNC: 20 U/L (ref 14–74)
BASOPHILS # BLD: 0 K/UL (ref 0–0.1)
BASOPHILS NFR BLD: 0 % (ref 0–2)
BILIRUB SERPL-MCNC: 0.4 MG/DL (ref 0.2–1)
BUN SERPL-MCNC: 6 MG/DL (ref 9–21)
BUN/CREAT SERPL: 12
CA-I BLD-MCNC: 9.7 MG/DL (ref 8.5–10.5)
CHLORIDE SERPL-SCNC: 102 MMOL/L (ref 94–111)
CO2 SERPL-SCNC: 28 MMOL/L (ref 21–33)
CREAT SERPL-MCNC: 0.5 MG/DL (ref 0.7–1.2)
EOSINOPHIL # BLD: 0.2 K/UL (ref 0–0.4)
EOSINOPHIL NFR BLD: 3 % (ref 0–5)
ERYTHROCYTE [DISTWIDTH] IN BLOOD BY AUTOMATED COUNT: 13.4 % (ref 11.6–14.5)
GLOBULIN SER CALC-MCNC: 4.2 G/DL
GLUCOSE SERPL-MCNC: 99 MG/DL (ref 70–110)
HCT VFR BLD AUTO: 41.8 % (ref 35–45)
HGB BLD-MCNC: 13.6 G/DL (ref 12–16)
IMM GRANULOCYTES # BLD AUTO: 0 K/UL
IMM GRANULOCYTES NFR BLD AUTO: 0 %
LIPASE SERPL-CCNC: 24 U/L (ref 10–57)
LYMPHOCYTES # BLD: 1.4 K/UL (ref 0.9–3.6)
LYMPHOCYTES NFR BLD: 17 % (ref 21–52)
MCH RBC QN AUTO: 29 PG (ref 24–34)
MCHC RBC AUTO-ENTMCNC: 32.5 G/DL (ref 31–37)
MCV RBC AUTO: 89.1 FL (ref 74–97)
MONOCYTES # BLD: 0.3 K/UL (ref 0.05–1.2)
MONOCYTES NFR BLD: 3 % (ref 3–10)
NEUTS SEG # BLD: 6.6 K/UL (ref 1.8–8)
NEUTS SEG NFR BLD: 77 % (ref 40–73)
PLATELET # BLD AUTO: 442 K/UL (ref 135–420)
PMV BLD AUTO: 8.7 FL (ref 9.2–11.8)
POTASSIUM SERPL-SCNC: 4.4 MMOL/L (ref 3.2–5.1)
PROT SERPL-MCNC: 8.7 G/DL (ref 6.1–8.4)
RBC # BLD AUTO: 4.69 M/UL (ref 4.2–5.3)
SODIUM SERPL-SCNC: 137 MMOL/L (ref 135–145)
WBC # BLD AUTO: 8.5 K/UL (ref 4.6–13.2)

## 2020-12-17 PROCEDURE — 80053 COMPREHEN METABOLIC PANEL: CPT

## 2020-12-17 PROCEDURE — 99285 EMERGENCY DEPT VISIT HI MDM: CPT

## 2020-12-17 PROCEDURE — 74011250637 HC RX REV CODE- 250/637: Performed by: EMERGENCY MEDICINE

## 2020-12-17 PROCEDURE — 74011000636 HC RX REV CODE- 636: Performed by: EMERGENCY MEDICINE

## 2020-12-17 PROCEDURE — 74011250637 HC RX REV CODE- 250/637

## 2020-12-17 PROCEDURE — 74177 CT ABD & PELVIS W/CONTRAST: CPT

## 2020-12-17 PROCEDURE — 85025 COMPLETE CBC W/AUTO DIFF WBC: CPT

## 2020-12-17 PROCEDURE — 83690 ASSAY OF LIPASE: CPT

## 2020-12-17 PROCEDURE — 74011000250 HC RX REV CODE- 250: Performed by: EMERGENCY MEDICINE

## 2020-12-17 RX ORDER — FAMOTIDINE 20 MG/1
20 TABLET, FILM COATED ORAL
Status: COMPLETED | OUTPATIENT
Start: 2020-12-17 | End: 2020-12-17

## 2020-12-17 RX ORDER — OMEPRAZOLE 40 MG/1
40 CAPSULE, DELAYED RELEASE ORAL DAILY
Qty: 20 CAP | Refills: 0 | Status: SHIPPED | OUTPATIENT
Start: 2020-12-17 | End: 2021-05-27

## 2020-12-17 RX ORDER — DICYCLOMINE HYDROCHLORIDE 10 MG/1
20 CAPSULE ORAL
Status: COMPLETED | OUTPATIENT
Start: 2020-12-17 | End: 2020-12-17

## 2020-12-17 RX ORDER — MAG HYDROX/ALUMINUM HYD/SIMETH 200-200-20
30 SUSPENSION, ORAL (FINAL DOSE FORM) ORAL ONCE
Status: COMPLETED | OUTPATIENT
Start: 2020-12-17 | End: 2020-12-17

## 2020-12-17 RX ORDER — SODIUM CHLORIDE 0.9 % (FLUSH) 0.9 %
5-10 SYRINGE (ML) INJECTION
Status: COMPLETED | OUTPATIENT
Start: 2020-12-17 | End: 2020-12-17

## 2020-12-17 RX ORDER — MAG HYDROX/ALUMINUM HYD/SIMETH 200-200-20
SUSPENSION, ORAL (FINAL DOSE FORM) ORAL
Status: COMPLETED
Start: 2020-12-17 | End: 2020-12-17

## 2020-12-17 RX ORDER — ALUMINA, MAGNESIA, AND SIMETHICONE 2400; 2400; 240 MG/30ML; MG/30ML; MG/30ML
30 SUSPENSION ORAL
Status: DISCONTINUED | OUTPATIENT
Start: 2020-12-17 | End: 2020-12-17

## 2020-12-17 RX ORDER — DICYCLOMINE HYDROCHLORIDE 10 MG/5ML
20 SOLUTION ORAL
Qty: 75 ML | Refills: 0 | Status: SHIPPED | OUTPATIENT
Start: 2020-12-17 | End: 2020-12-17 | Stop reason: SDUPTHER

## 2020-12-17 RX ORDER — LIDOCAINE HYDROCHLORIDE 20 MG/ML
5 SOLUTION OROPHARYNGEAL
Status: COMPLETED | OUTPATIENT
Start: 2020-12-17 | End: 2020-12-17

## 2020-12-17 RX ORDER — DICYCLOMINE HYDROCHLORIDE 10 MG/5ML
20 SOLUTION ORAL
Qty: 120 ML | Refills: 0 | Status: SHIPPED | OUTPATIENT
Start: 2020-12-17 | End: 2021-05-27

## 2020-12-17 RX ADMIN — Medication 30 ML: at 07:55

## 2020-12-17 RX ADMIN — FAMOTIDINE 20 MG: 20 TABLET, FILM COATED ORAL at 11:44

## 2020-12-17 RX ADMIN — LIDOCAINE HYDROCHLORIDE 5 ML: 20 SOLUTION ORAL; TOPICAL at 07:55

## 2020-12-17 RX ADMIN — DIATRIZOATE MEGLUMINE AND DIATRIZOATE SODIUM 25 ML: 660; 100 LIQUID ORAL; RECTAL at 08:45

## 2020-12-17 RX ADMIN — ALUMINUM HYDROXIDE, MAGNESIUM HYDROXIDE, AND SIMETHICONE 30 ML: 200; 200; 20 SUSPENSION ORAL at 07:55

## 2020-12-17 RX ADMIN — DICYCLOMINE HYDROCHLORIDE 20 MG: 10 CAPSULE ORAL at 11:44

## 2020-12-17 RX ADMIN — IOPAMIDOL 100 ML: 755 INJECTION, SOLUTION INTRAVENOUS at 08:57

## 2020-12-17 RX ADMIN — Medication 10 ML: at 08:57

## 2020-12-17 NOTE — ED TRIAGE NOTES
Pt states she has had epigastric pain x approx 2 weeks, states she has lost weight because she can't eat. Pt denies n/v/d. Pt states it was worse at night, but now it is all the time. Pt states she brought her uncle to have surgery so she thought she would get checked out while she is waiting.

## 2020-12-17 NOTE — ED PROVIDER NOTES
EMERGENCY DEPARTMENT HISTORY AND PHYSICAL EXAM      Date: 12/17/2020  Patient Name: Hilda Bond    History of Presenting Illness     Chief Complaint   Patient presents with    Epigastric Pain       History Provided By: Patient    HPI: Hilda Bond, 44 y.o. female with a past medical history significant hypertension and Anemia, chronic low back pain, is s/p gastric bypass presents to the ED with cc of 2 weeks of epigastric pain. Pain is sharp, localized in the epigastric area, is constant with episodes of exacerbation and has been worsening over the last 2 weeks. Patient brought  her  for surgery to the hospital when the pain got even worse. She has no nausea no vomiting and last bowel movement was yesterday. She rates the pain is a 10 out of 10. She has had cholecystectomy, and hysterectomy. There are no other complaints, changes, or physical findings at this time. PCP: Iron Powell MD    No current facility-administered medications on file prior to encounter. Current Outpatient Medications on File Prior to Encounter   Medication Sig Dispense Refill    baclofen (LIORESAL) 10 mg tablet   0    cloNIDine HCl (CATAPRES) 0.1 mg tablet       lisinopril (PRINIVIL, ZESTRIL) 10 mg tablet Take 40 mg by mouth.  oxyCODONE-acetaminophen (PERCOCET 10)  mg per tablet       dextroamphetamine-amphetamine (ADDERALL) 10 mg tablet Take 30 mg by mouth two (2) times a day.  [DISCONTINUED] triamterene-hydroCHLOROthiazide (DYAZIDE) 37.5-25 mg per capsule   0    TENS Units (TENS 48) jazzmine 47-year-old female with chronic lower back pain. Please provide her with a TENS unit to use to help improve her function, improve quality of life, decrease pain, and decreased medication usage. Thank you 1 Device 0    [DISCONTINUED] Back Brace misc 1 Units by Does Not Apply route daily.  As directed 1 Device 1       Past History     Past Medical History:  Past Medical History: Diagnosis Date    Anemia     Chronic low back pain     Constipation     Dysmenorrhea     Eczema     Fatigue     Hypertension     Insomnia     Iron deficiency anemia     Neurological disorder     pain, difficulty writing    Secondary thrombocytosis        Past Surgical History:  Past Surgical History:   Procedure Laterality Date    HX ABDOMINOPLASTY      HX BREAST REDUCTION      HX GASTRIC BYPASS  1997    HX HYSTERECTOMY      HX HYSTERECTOMY  2012    HX ORTHOPAEDIC      left foot reconstruction     HX TUBAL LIGATION      bilateral       Family History:  Family History   Problem Relation Age of Onset    Cancer Other         NOS    Heart Disease Other         NOS    Stroke Father     Heart Attack Father     Breast Cancer Mother        Social History:  Social History     Tobacco Use    Smoking status: Never Smoker    Smokeless tobacco: Never Used   Substance Use Topics    Alcohol use: No    Drug use: No       Allergies: Allergies   Allergen Reactions    Latex Rash    Ibuprofen Not Reported This Time         Review of Systems     Review of Systems   Constitutional: Negative for chills and fever. HENT: Negative for congestion and sore throat. Respiratory: Negative for cough and shortness of breath. Cardiovascular: Negative for chest pain. Gastrointestinal: Positive for abdominal pain. Negative for abdominal distention, nausea and vomiting. Genitourinary: Negative for difficulty urinating, dysuria, flank pain, frequency, hematuria, urgency, vaginal bleeding and vaginal discharge. Musculoskeletal: Negative for arthralgias and joint swelling. Skin: Negative for rash and wound. Neurological: Negative for dizziness, weakness, light-headedness and headaches. Hematological: Negative for adenopathy. Physical Exam     Physical Exam  Vitals signs and nursing note reviewed. Constitutional:       General: She is not in acute distress. Appearance: She is well-developed. She is not diaphoretic. HENT:      Head: Normocephalic and atraumatic. Jaw: No trismus. Right Ear: External ear normal. No swelling or tenderness. Tympanic membrane is not perforated, erythematous or bulging. Left Ear: External ear normal. No swelling or tenderness. Tympanic membrane is not perforated, erythematous or bulging. Nose: Nose normal. No mucosal edema or rhinorrhea. Right Sinus: No maxillary sinus tenderness or frontal sinus tenderness. Left Sinus: No maxillary sinus tenderness or frontal sinus tenderness. Mouth/Throat:      Mouth: No oral lesions. Dentition: No dental abscesses. Pharynx: Uvula midline. No oropharyngeal exudate, posterior oropharyngeal erythema or uvula swelling. Tonsils: No tonsillar abscesses. Eyes:      General: No scleral icterus. Right eye: No discharge. Left eye: No discharge. Conjunctiva/sclera: Conjunctivae normal.   Neck:      Musculoskeletal: Normal range of motion and neck supple. Cardiovascular:      Rate and Rhythm: Normal rate and regular rhythm. Heart sounds: Normal heart sounds. No murmur. No friction rub. No gallop. Pulmonary:      Effort: Pulmonary effort is normal. No tachypnea, accessory muscle usage or respiratory distress. Breath sounds: Normal breath sounds. No decreased breath sounds, wheezing, rhonchi or rales. Abdominal:      Palpations: Abdomen is soft. Tenderness: There is abdominal tenderness. There is guarding. Comments: There is tenderness to palpation of the epigastric area with some  voluntary guarding. No rebound. Musculoskeletal: Normal range of motion. General: No tenderness. Lymphadenopathy:      Cervical: No cervical adenopathy. Skin:     General: Skin is warm and dry. Findings: No erythema or rash. Neurological:      Mental Status: She is alert and oriented to person, place, and time.    Psychiatric:         Judgment: Judgment normal.         Lab and Diagnostic Study Results     Labs -     Recent Results (from the past 12 hour(s))   CBC WITH AUTOMATED DIFF    Collection Time: 12/17/20  7:50 AM   Result Value Ref Range    WBC 8.5 4.6 - 13.2 K/uL    RBC 4.69 4.20 - 5.30 M/uL    HGB 13.6 12.0 - 16.0 g/dL    HCT 41.8 35.0 - 45.0 %    MCV 89.1 74.0 - 97.0 FL    MCH 29.0 24.0 - 34.0 PG    MCHC 32.5 31.0 - 37.0 g/dL    RDW 13.4 11.6 - 14.5 %    PLATELET 610 (H) 560 - 420 K/uL    MPV 8.7 (L) 9.2 - 11.8 FL    NEUTROPHILS 77 (H) 40 - 73 %    LYMPHOCYTES 17 (L) 21 - 52 %    MONOCYTES 3 3 - 10 %    EOSINOPHILS 3 0 - 5 %    BASOPHILS 0 0 - 2 %    IMMATURE GRANULOCYTES 0 %    ABS. NEUTROPHILS 6.6 1.8 - 8.0 K/UL    ABS. LYMPHOCYTES 1.4 0.9 - 3.6 K/UL    ABS. MONOCYTES 0.3 0.05 - 1.2 K/UL    ABS. EOSINOPHILS 0.2 0.0 - 0.4 K/UL    ABS. BASOPHILS 0.0 0.0 - 0.1 K/UL    ABS. IMM. GRANS. 0.0 K/UL   METABOLIC PANEL, COMPREHENSIVE    Collection Time: 12/17/20  7:50 AM   Result Value Ref Range    Sodium 137 135 - 145 mmol/L    Potassium 4.4 3.2 - 5.1 mmol/L    Chloride 102 94 - 111 mmol/L    CO2 28 21 - 33 mmol/L    Anion gap 7 mmol/L    Glucose 99 70 - 110 mg/dL    BUN 6 (L) 9 - 21 mg/dL    Creatinine 0.50 (L) 0.70 - 1.20 mg/dL    BUN/Creatinine ratio 12      GFR est AA >60 ml/min/1.73m2    GFR est non-AA >60 ml/min/1.73m2    Calcium 9.7 8.5 - 10.5 mg/dL    Bilirubin, total 0.4 0.2 - 1.0 mg/dL    AST (SGOT) 20 14 - 74 U/L    ALT (SGPT) 20 3 - 52 U/L    Alk. phosphatase 103 38 - 126 U/L    Protein, total 8.7 (H) 6.1 - 8.4 g/dL    Albumin 4.5 3.5 - 4.7 g/dL    Globulin 4.2 g/dL    A-G Ratio 1.1     LIPASE    Collection Time: 12/17/20  7:50 AM   Result Value Ref Range    Lipase 24 10 - 57 U/L       Radiologic Studies -   @lastxrresult@  CT Results  (Last 48 hours)               12/17/20 0905  CT ABD PELV W CONT Final result    Impression:  IMPRESSION:       1.   Status post Ara-en-Y gastric bypass with short segment enteric   intussusception at what appears to be the inferior aspect of the distal jejunal   anastomosis, which may be transient. The appearance is nonobstructive as there   is oral contrast in the distal small bowel. 2. 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       -Recommend bariatric surgical consultation for above findings. Narrative:  EXAM: CT of the abdomen and pelvis       INDICATION: Epigastric abdominal pain, history of gastric bypass       COMPARISON: 11/28/2010       TECHNIQUE: Axial CT imaging of the abdomen and pelvis was performed with oral   and intravenous intravenous contrast. Multiplanar reformats were generated. One   or more dose reduction techniques were used on this CT: automated exposure   control, adjustment of the mAs and/or kVp according to patient size, and   iterative reconstruction techniques. The specific techniques used on this CT   exam have been documented in the patient's electronic medical record. Digital   Imaging and Communications in Medicine (DICOM) format image data are available   to nonaffiliated external healthcare facilities or entities on a secure, media   free, reciprocally searchable basis with patient authorization for at least a   12-month period after this study. _______________       FINDINGS:       LOWER CHEST: No acute pulmonary parenchymal process. > Asymmetric indeterminate right inferolateral breast 7 mm mass (axial   15/coronal 21). LIVER, BILIARY: Liver is normal. No biliary dilation. Prior cholecystectomy. PANCREAS: Normal.       SPLEEN: Normal.       ADRENALS: Normal.       KIDNEYS, URETERS, BLADDER: Mild asymmetric fullness right pelvic collecting   system without overt hydronephrosis. No right-sided hydroureter. Both kidneys   enhance symmetrically.  Unremarkable bladder       GASTROINTESTINAL TRACT: Status post Ara-en-Y gastric bypass, with abnormal oral   contrast outlining the excluded stomach and nondilated pancreaticoduodenal limb.         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 > Suboptimal oral contrast opacification of the diverting jejunal limb, with   short intussusception in the left abdomen at the jejunal jejunal anastomosis   (axial (axial 65/coronal 48), measuring 3 cm in length. Oral contrast is seen   distally in the small bowel loops in left abdomen.         > Large bowel: No dilatation or bowel wall thickening. LYMPH NODES: No enlarged lymph nodes. PELVIC ORGANS: Prior hysterectomy       VASCULATURE: Normal caliber aorta with moderate sclerotic changes. Normal   anatomic relation of the IMV and SMA. OSSEOUS: No acute or aggressive osseous abnormalities identified. Degenerative   vacuum disc phenomena at L4-5. OTHER: None.       _______________               CXR Results  (Last 48 hours)    None            Medical Decision Making   - I am the first provider for this patient. - I reviewed the vital signs, available nursing notes, past medical history, past surgical history, family history and social history. - Initial assessment performed. The patients presenting problems have been discussed, and they are in agreement with the care plan formulated and outlined with them. I have encouraged them to ask questions as they arise throughout their visit. Vital Signs-Reviewed the patient's vital signs.   Patient Vitals for the past 12 hrs:   Temp Pulse Resp BP SpO2   12/17/20 1036  62 16 133/72 100 %   12/17/20 0926     100 %   12/17/20 0926  64 16 131/78 100 %   12/17/20 0831  60 16 130/81 100 %   12/17/20 0710 98.5 °F (36.9 °C) 70 16 (!) 144/83 99 %       Records Reviewed: Nursing Notes and Old Medical Records    The patient presents with abdominal pain with a differential diagnosis of AAA, biliary colic, gastroenteritis, GERD, obstruction, pancreatitis, renal Colic and diverticulitis, ACS      ED Course:   11:30 am:  I spoke with Dr. Arslan Frausto at [facility], regarding patient's case. I advised him of CT findings, and he said she does not need acute intervention. She can be put on antispasmodic and possibly antacid. He will follow-up in office, and she needs to call for an appointment. Provider Notes (Medical Decision Making):           Procedures   Medical Decision Makingedical Decision Making        Disposition   Disposition:    Diagnosis:   1. Epigastric pain    2. History of Ara-en-Y gastric bypass          Disposition:     Follow-up Information     Follow up With Specialties Details Why Contact Info    Benja Officer, MD Bariatrics, General Surgery Schedule an appointment as soon as possible for a visit in 2 days For abdominal pain [address]  [place] [address]  [phone]            Patient's Medications   Start Taking    No medications on file   Continue Taking    BACLOFEN (LIORESAL) 10 MG TABLET        CLONIDINE HCL (CATAPRES) 0.1 MG TABLET        DEXTROAMPHETAMINE-AMPHETAMINE (ADDERALL) 10 MG TABLET    Take 30 mg by mouth two (2) times a day. LISINOPRIL (PRINIVIL, ZESTRIL) 10 MG TABLET    Take 40 mg by mouth. OXYCODONE-ACETAMINOPHEN (PERCOCET 10)  MG PER TABLET        TENS UNITS (TENS 504) KARSTEN    20-year-old female with chronic lower back pain. Please provide her with a TENS unit to use to help improve her function, improve quality of life, decrease pain, and decreased medication usage. Thank you   These Medications have changed    No medications on file   Stop Taking    BACK BRACE MISC    1 Units by Does Not Apply route daily. As directed    TRIAMTERENE-HYDROCHLOROTHIAZIDE (DYAZIDE) 37.5-25 MG PER CAPSULE           DISCHARGE PLAN:  1.    Current Discharge Medication List CONTINUE these medications which have NOT CHANGED    Details   baclofen (LIORESAL) 10 mg tablet Refills: 0      cloNIDine HCl (CATAPRES) 0.1 mg tablet       lisinopril (PRINIVIL, ZESTRIL) 10 mg tablet Take 40 mg by mouth. oxyCODONE-acetaminophen (PERCOCET 10)  mg per tablet       dextroamphetamine-amphetamine (ADDERALL) 10 mg tablet Take 30 mg by mouth two (2) times a day. TENS Units (TENS 48) jazzmine 78-year-old female with chronic lower back pain. Please provide her with a TENS unit to use to help improve her function, improve quality of life, decrease pain, and decreased medication usage. Thank you  Qty: 1 Device, Refills: 0    Associated Diagnoses: Chronic pain syndrome; Spondylosis of lumbar spine; Piriformis muscle pain; Sacroiliitis ([address] Chronic bilateral low back pain with bilateral sciatica           2. Follow-up Information     Follow up With Specialties Details Why Contact Info    Drew Lockwood MD Bariatrics, General Surgery Schedule an appointment as soon as possible for a visit in 2 days For abdominal pain [address] [place] [address] [address]  [phone]          3. Return to ED if worse   4. Current Discharge Medication List            Diagnosis     Clinical Impression:   1. Epigastric pain    2. History of Ara-en-Y gastric bypass        Attestations:  By signing my name below, I, Katie Mishra, attest that this documentation has been prepared under the direction and in presence of Dr. Lula Ramos on 12/17/20. Electronically signed: Katie Mishra, 12/17/20, 11:33 AM      Katie Mishra    Please note that this dictation was completed with [employer], the [employer] voice recognition software. Quite often unanticipated grammatical, syntax, homophones, and other interpretive errors are inadvertently transcribed by the computer software. Please disregard these errors. Please excuse any errors that have escaped final proofreading. Thank you.

## 2021-05-27 ENCOUNTER — HOSPITAL ENCOUNTER (EMERGENCY)
Age: 51
Discharge: HOME | End: 2021-05-27
Attending: EMERGENCY MEDICINE
Payer: MEDICARE

## 2021-05-27 ENCOUNTER — APPOINTMENT (OUTPATIENT)
Dept: VASCULAR SURGERY | Age: 51
End: 2021-05-27
Attending: PHYSICIAN ASSISTANT
Payer: MEDICARE

## 2021-05-27 ENCOUNTER — APPOINTMENT (OUTPATIENT)
Dept: GENERAL RADIOLOGY | Age: 51
End: 2021-05-27
Attending: PHYSICIAN ASSISTANT
Payer: MEDICARE

## 2021-05-27 VITALS
SYSTOLIC BLOOD PRESSURE: 134 MMHG | TEMPERATURE: 98.5 F | RESPIRATION RATE: 16 BRPM | BODY MASS INDEX: 34.16 KG/M2 | DIASTOLIC BLOOD PRESSURE: 77 MMHG | HEIGHT: 65 IN | HEART RATE: 68 BPM | WEIGHT: 205 LBS | OXYGEN SATURATION: 98 %

## 2021-05-27 DIAGNOSIS — M17.11 OSTEOARTHRITIS OF RIGHT KNEE, UNSPECIFIED OSTEOARTHRITIS TYPE: ICD-10-CM

## 2021-05-27 DIAGNOSIS — M79.604 RIGHT LEG PAIN: Primary | ICD-10-CM

## 2021-05-27 DIAGNOSIS — M54.31 SCIATICA OF RIGHT SIDE: ICD-10-CM

## 2021-05-27 PROCEDURE — 99282 EMERGENCY DEPT VISIT SF MDM: CPT

## 2021-05-27 PROCEDURE — 93971 EXTREMITY STUDY: CPT

## 2021-05-27 PROCEDURE — 73590 X-RAY EXAM OF LOWER LEG: CPT

## 2021-05-27 RX ORDER — PREDNISONE 10 MG/1
TABLET ORAL
Qty: 21 TABLET | Refills: 0 | Status: SHIPPED | OUTPATIENT
Start: 2021-05-27 | End: 2022-07-18

## 2021-05-27 RX ORDER — METHOCARBAMOL 750 MG/1
750 TABLET, FILM COATED ORAL 3 TIMES DAILY
Qty: 15 TABLET | Refills: 0 | Status: SHIPPED | OUTPATIENT
Start: 2021-05-27 | End: 2021-06-01

## 2021-05-27 NOTE — ED NOTES
Huber Read is a 44 y.o. female that was discharged in stable condition. The patients diagnosis, condition and treatment were explained to  patient and aftercare instructions were given. The patient verbalized understanding. Patient armband removed and shredded.

## 2021-05-27 NOTE — ED PROVIDER NOTES
EMERGENCY DEPARTMENT HISTORY AND PHYSICAL EXAM    Date: 5/27/2021  Patient Name: Soledad Erwin    History of Presenting Illness     Chief Complaint   Patient presents with    Leg Pain         History Provided By: Patient    1:12 PM  Soledad Erwin is a 44 y.o. female with PMHX of HTN, chronic low back pain  who presents to the emergency department C/O right popliteal pain radiating down right lateral calf to ankle x3 weeks. No known injury or trauma. Pain is not alleviated or exacerbated by anything. Feels like it sometimes will give out. Pt denies injury, trauma, extremity numbness or weakness, swelling, redness, knee pain, ankle pain, hip pain, worsening lower back pain, saddle anesthesia, bowel or bladder incontinence and any other sxs or complaints. PCP: Leopold Bible, MD    Current Outpatient Medications   Medication Sig Dispense Refill    predniSONE (STERAPRED DS) 10 mg dose pack Use per pack instructions. 21 Tablet 0    methocarbamoL (Robaxin-750) 750 mg tablet Take 1 Tablet by mouth three (3) times daily for 5 days. 15 Tablet 0    cloNIDine HCl (CATAPRES) 0.1 mg tablet       lisinopril (PRINIVIL, ZESTRIL) 10 mg tablet Take 40 mg by mouth.  dextroamphetamine-amphetamine (ADDERALL) 10 mg tablet Take 30 mg by mouth two (2) times a day.  TENS Units (TENS 48) jazzmine 47-year-old female with chronic lower back pain. Please provide her with a TENS unit to use to help improve her function, improve quality of life, decrease pain, and decreased medication usage.   Thank you 1 Device 0       Past History     Past Medical History:  Past Medical History:   Diagnosis Date    Anemia     Chronic low back pain     Constipation     Dysmenorrhea     Eczema     Fatigue     Hypertension     Insomnia     Iron deficiency anemia     Neurological disorder     pain, difficulty writing    Secondary thrombocytosis        Past Surgical History:  Past Surgical History:   Procedure Laterality Date    HX ABDOMINOPLASTY      HX BREAST REDUCTION      HX GASTRIC BYPASS  1997    HX HYSTERECTOMY      HX HYSTERECTOMY  2012    HX ORTHOPAEDIC      left foot reconstruction     HX TUBAL LIGATION      bilateral       Family History:  Family History   Problem Relation Age of Onset    Cancer Other         NOS    Heart Disease Other         NOS    Stroke Father     Heart Attack Father     Breast Cancer Mother        Social History:  Social History     Tobacco Use    Smoking status: Never Smoker    Smokeless tobacco: Never Used   Substance Use Topics    Alcohol use: No    Drug use: No       Allergies: Allergies   Allergen Reactions    Latex Rash    Ibuprofen Not Reported This Time         Review of Systems   Review of Systems   Musculoskeletal: Positive for myalgias. Skin: Negative. Neurological: Negative for weakness and numbness. All other systems reviewed and are negative. Physical Exam     Vitals:    05/27/21 1218   BP: 134/77   Pulse: 68   Resp: 16   Temp: 98.5 °F (36.9 °C)   SpO2: 98%   Weight: 93 kg (205 lb)   Height: 5' 5\" (1.651 m)     Physical Exam  Vitals and nursing note reviewed. Constitutional:       General: She is not in acute distress. Appearance: Normal appearance. She is normal weight. HENT:      Head: Normocephalic and atraumatic. Musculoskeletal:      Right hip: Normal.        Legs:    Skin:     General: Skin is warm and dry. Neurological:      Mental Status: She is alert. Psychiatric:         Mood and Affect: Mood normal.         Behavior: Behavior normal.               Diagnostic Study Results     Labs -   No results found for this or any previous visit (from the past 12 hour(s)). Radiologic Studies -   XR TIB/FIB RT   Final Result   No radiographic evidence of acute osseous abnormality.          CT Results  (Last 48 hours)    None        CXR Results  (Last 48 hours)    None          Medications given in the ED-  Medications - No data to display      Medical Decision Making   I am the first provider for this patient. I reviewed the vital signs, available nursing notes, past medical history, past surgical history, family history and social history. Vital Signs-Reviewed the patient's vital signs. Records Reviewed: Nursing Notes      Procedures:  Procedures    ED Course:  1:12 PM   Initial assessment performed. The patients presenting problems have been discussed, and they are in agreement with the care plan formulated and outlined with them. I have encouraged them to ask questions as they arise throughout their visit. Provider Notes (Medical Decision Making): Soledad Erwin is a 44 y.o. female presents with 3 weeks of right lateral leg pain. No injury or trauma. Neurovascular intact with mild tenderness to the lateral knee and lower leg. Right lower extremity duplex negative for DVT. Right tib-fib x-ray shows no acute process. Exam is more consistent with sciatica. Will start on prednisone taper, muscle relaxer as needed and follow-up with Ortho if symptoms do not improve. Diagnosis and Disposition       DISCHARGE NOTE:    Soledad Erwin  results have been reviewed with her. She has been counseled regarding her diagnosis, treatment, and plan. She verbally conveys understanding and agreement of the signs, symptoms, diagnosis, treatment and prognosis and additionally agrees to follow up as discussed. She also agrees with the care-plan and conveys that all of her questions have been answered. I have also provided discharge instructions for her that include: educational information regarding their diagnosis and treatment, and list of reasons why they would want to return to the ED prior to their follow-up appointment, should her condition change. She has been provided with education for proper emergency department utilization. CLINICAL IMPRESSION:    1. Right leg pain    2. Sciatica of right side    3. Osteoarthritis of right knee, unspecified osteoarthritis type        PLAN:  1. D/C Home  2. Discharge Medication List as of 5/27/2021  2:40 PM      START taking these medications    Details   predniSONE (STERAPRED DS) 10 mg dose pack Use per pack instructions. , Normal, Disp-21 Tablet, R-0      methocarbamoL (Robaxin-750) 750 mg tablet Take 1 Tablet by mouth three (3) times daily for 5 days. , Normal, Disp-15 Tablet, R-0         CONTINUE these medications which have NOT CHANGED    Details   cloNIDine HCl (CATAPRES) 0.1 mg tablet Historical Med      lisinopril (PRINIVIL, ZESTRIL) 10 mg tablet Take 40 mg by mouth., Historical Med      dextroamphetamine-amphetamine (ADDERALL) 10 mg tablet Take 30 mg by mouth two (2) times a day.        , Historical Med      TENS Units (TENS 48) jazzmine 49-year-old female with chronic lower back pain. Please provide her with a TENS unit to use to help improve her function, improve quality of life, decrease pain, and decreased medication usage. Thank you, Print, Disp-1 Device, R-0         STOP taking these medications       oxyCODONE-acetaminophen (PERCOCET 10)  mg per tablet Comments:   Reason for Stopping:             3.   Follow-up Information     Follow up With Specialties Details Why Contact Info    Wali Rod MD Family Medicine Schedule an appointment as soon as possible for a visit   [address] EMERGENCY DEPT Emergency Medicine  As needed, If symptoms worsen [address] [facility]  [phone]        _______________________________      Please note that this dictation was completed with [employer], the [employer] voice recognition software. Quite often unanticipated grammatical, syntax, homophones, and other interpretive errors are inadvertently transcribed by the computer software. Please disregard these errors. Please excuse any errors that have escaped final proofreading.

## 2021-05-27 NOTE — ED TRIAGE NOTES
Patient c/o pain in right leg. She states it aches like a toothache and leg gives out on her. Symptoms x 3 weeks.

## 2022-07-18 ENCOUNTER — HOSPITAL ENCOUNTER (EMERGENCY)
Age: 52
Discharge: HOME | End: 2022-07-18
Attending: STUDENT IN AN ORGANIZED HEALTH CARE EDUCATION/TRAINING PROGRAM
Payer: MEDICARE

## 2022-07-18 VITALS
WEIGHT: 207 LBS | TEMPERATURE: 98.3 F | HEIGHT: 64 IN | OXYGEN SATURATION: 99 % | HEART RATE: 71 BPM | RESPIRATION RATE: 20 BRPM | DIASTOLIC BLOOD PRESSURE: 85 MMHG | SYSTOLIC BLOOD PRESSURE: 143 MMHG | BODY MASS INDEX: 35.34 KG/M2

## 2022-07-18 DIAGNOSIS — K08.89 DENTALGIA: Primary | ICD-10-CM

## 2022-07-18 DIAGNOSIS — K04.7 DENTAL INFECTION: ICD-10-CM

## 2022-07-18 PROCEDURE — 99283 EMERGENCY DEPT VISIT LOW MDM: CPT

## 2022-07-18 PROCEDURE — 74011250637 HC RX REV CODE- 250/637: Performed by: STUDENT IN AN ORGANIZED HEALTH CARE EDUCATION/TRAINING PROGRAM

## 2022-07-18 RX ORDER — PENICILLIN V POTASSIUM 250 MG/1
500 TABLET, FILM COATED ORAL
Status: COMPLETED | OUTPATIENT
Start: 2022-07-18 | End: 2022-07-18

## 2022-07-18 RX ORDER — HYDROCODONE BITARTRATE AND ACETAMINOPHEN 5; 325 MG/1; MG/1
1 TABLET ORAL ONCE
Status: COMPLETED | OUTPATIENT
Start: 2022-07-18 | End: 2022-07-18

## 2022-07-18 RX ORDER — PENICILLIN V POTASSIUM 500 MG/1
500 TABLET, FILM COATED ORAL 4 TIMES DAILY
Qty: 28 TABLET | Refills: 0 | Status: SHIPPED | OUTPATIENT
Start: 2022-07-18 | End: 2022-07-25

## 2022-07-18 RX ORDER — CHLORHEXIDINE GLUCONATE 1.2 MG/ML
15 RINSE ORAL EVERY 12 HOURS
Qty: 420 ML | Refills: 0 | Status: SHIPPED | OUTPATIENT
Start: 2022-07-18 | End: 2022-08-01

## 2022-07-18 RX ORDER — NAPROXEN 500 MG/1
500 TABLET ORAL 2 TIMES DAILY WITH MEALS
Qty: 28 TABLET | Refills: 0 | Status: SHIPPED | OUTPATIENT
Start: 2022-07-18 | End: 2022-08-01

## 2022-07-18 RX ADMIN — PENICILLIN V POTASSIUM 500 MG: 250 TABLET, FILM COATED ORAL at 18:55

## 2022-07-18 RX ADMIN — HYDROCODONE BITARTRATE AND ACETAMINOPHEN 1 TABLET: 5; 325 TABLET ORAL at 18:55

## 2022-07-18 NOTE — ED TRIAGE NOTES
Patient presents with pain to left lower tooth. She c/o broken tooth and states that with [facility] on July 27th at 11:00.

## 2022-07-18 NOTE — ED PROVIDER NOTES
EMERGENCY DEPARTMENT HISTORY AND PHYSICAL EXAM      Date: 7/18/2022  Patient Name: Sung Nolan    History of Presenting Illness     Chief Complaint   Patient presents with    Dental Pain       History (Context): Sung Nolan is a 45 y.o. female with a past medical history significant for anemia, dysmenorrhea, hypertension comes into the ED today due to dental pain. Patient states that approximate 1 week ago she was eating some food and felt immediate pain to the left mandibular teeth. States that she noticed a fracture of one of her teeth in that region. States she has had significant pain and exacerbation with mastication. States that she is been taking over-the-counter medication for treatment of her symptoms prior to arrival.  States her symptoms are severe in quality. Denies any alleviating factors. PCP: Lajune Dubin, MD    Current Outpatient Medications   Medication Sig Dispense Refill    naproxen (NAPROSYN) 500 mg tablet Take 1 Tablet by mouth two (2) times daily (with meals) for 14 days. 28 Tablet 0    chlorhexidine (Peridex) 0.12 % solution 15 mL by Swish and Spit route every twelve (12) hours for 14 days. 420 mL 0    aluminum-magnesium hydroxide 200-200 mg/5 mL susp 5 mL, diphenhydrAMINE 12.5 mg/5 mL liqd 12.5 mg, lidocaine 2 % soln 5 mL 5 mL by Swish and Spit route two (2) times a day. Magic mouth wash   Maalox  Lidocaine 2% viscous   Diphenhydramine oral solution     Pharmacy to mix equal portions of ingredients to a total volume as indicated in the dispense amount. 150 mL 0    penicillin v potassium (VEETID) 500 mg tablet Take 1 Tablet by mouth four (4) times daily for 7 days. 28 Tablet 0    cloNIDine HCl (CATAPRES) 0.1 mg tablet       lisinopril (PRINIVIL, ZESTRIL) 10 mg tablet Take 40 mg by mouth.  dextroamphetamine-amphetamine (ADDERALL) 10 mg tablet Take 30 mg by mouth two (2) times a day.               TENS Units (TENS 48) jazzmine 40-year-old female with chronic lower back pain. Please provide her with a TENS unit to use to help improve her function, improve quality of life, decrease pain, and decreased medication usage. Thank you 1 Device 0       Past History     Past Medical History:   Past Medical History:   Diagnosis Date    Anemia     Chronic low back pain     Constipation     Dysmenorrhea     Eczema     Fatigue     Hypertension     Insomnia     Iron deficiency anemia     Neurological disorder     pain, difficulty writing    Secondary thrombocytosis        Past Surgical History:  Past Surgical History:   Procedure Laterality Date    HX ABDOMINOPLASTY      HX BREAST REDUCTION      HX GASTRIC BYPASS  1997    HX HYSTERECTOMY      HX HYSTERECTOMY  2012    HX ORTHOPAEDIC      left foot reconstruction     HX TUBAL LIGATION      bilateral       Family History:  Family History   Problem Relation Age of Onset    Cancer Other         NOS    Heart Disease Other         NOS    Stroke Father     Heart Attack Father     Breast Cancer Mother        Social History:   Social History     Tobacco Use    Smoking status: Never Smoker    Smokeless tobacco: Never Used   Substance Use Topics    Alcohol use: No    Drug use: No       Allergies: Allergies   Allergen Reactions    Latex Rash    Ibuprofen Not Reported This Time       PMH, PSH, family history, social history, allergies reviewed with the patient with significant items noted above. Review of Systems   Review of Systems   Constitutional: Negative for chills and fever. HENT: Positive for dental problem. Negative for sore throat. Eyes: Negative for visual disturbance. Respiratory: Negative for shortness of breath. Cardiovascular: Negative for chest pain. Gastrointestinal: Negative for abdominal pain, nausea and vomiting. Genitourinary: Negative for difficulty urinating. Musculoskeletal: Negative for myalgias. Skin: Negative for rash. Neurological: Negative for headaches. Physical Exam     Vitals:    07/18/22 1752   BP: (!) 143/85   Pulse: 71   Resp: 20   Temp: 98.3 °F (36.8 °C)   SpO2: 99%   Weight: 93.9 kg (207 lb)   Height: 5' 4\" (1.626 m)       Physical Exam  Vitals and nursing note reviewed. Constitutional:       General: She is not in acute distress. Appearance: Normal appearance. HENT:      Head: Normocephalic and atraumatic. Mouth/Throat:      Mouth: Mucous membranes are moist.      Comments: Partial fracture of the ? 20-23 tooth. Patient with multiple teeth extractions. No periapical abscess visualized. Eyes:      General: No scleral icterus. Conjunctiva/sclera: Conjunctivae normal.   Cardiovascular:      Rate and Rhythm: Normal rate and regular rhythm. Comments: Normal peripheral perfusion  Pulmonary:      Effort: Pulmonary effort is normal.      Breath sounds: Normal breath sounds. Abdominal:      General: There is no distension. Palpations: Abdomen is soft. Tenderness: There is no abdominal tenderness. Musculoskeletal:         General: No deformity. Normal range of motion. Cervical back: Normal range of motion and neck supple. Skin:     General: Skin is warm and dry. Findings: No rash. Neurological:      General: No focal deficit present. Mental Status: She is alert and oriented to person, place, and time. Mental status is at baseline. Psychiatric:         Mood and Affect: Mood normal.         Thought Content: Thought content normal.         Diagnostic Study Results     Labs -   No results found for this or any previous visit (from the past 12 hour(s)). Labs Reviewed - No data to display    Radiologic Studies -   No orders to display     CT Results  (Last 48 hours)    None        CXR Results  (Last 48 hours)    None          The laboratory results, imaging results, and other diagnostic exams were reviewed in the EMR. Medical Decision Making   I am the first provider for this patient.     I reviewed the vital signs, available nursing notes, past medical history, past surgical history, family history and social history. Vital Signs-Reviewed the patient's vital signs. Records Reviewed: Personally, on initial evaluation    MDM:   Inez Mcadams presents with complaint of dental pain   DDX includes but is not limited to: Dental infection, dentalgia, tooth fracture    Patient overall well-appearing, no acute distress, vital signs gross within normal limits. Will provide patient with medication while here and discharge patient with outpatient treatment. Patient does have follow-up appointment with dentistry in approximately 1 to 2 weeks. Provided patient with strict return precautions and follow-up recommendations. Patient verbalized understanding is without any further questions. Will discharge patient home at this time. Orders as below:  Orders Placed This Encounter    HYDROcodone-acetaminophen (NORCO) 5-325 mg per tablet 1 Tablet    penicillin v potassium (VEETID) tablet 500 mg    naproxen (NAPROSYN) 500 mg tablet    chlorhexidine (Peridex) 0.12 % solution    aluminum-magnesium hydroxide 200-200 mg/5 mL susp 5 mL, diphenhydrAMINE 12.5 mg/5 mL liqd 12.5 mg, lidocaine 2 % soln 5 mL    penicillin v potassium (VEETID) 500 mg tablet        ED Course:            Procedures:  Procedures        Diagnosis and Disposition     CLINICAL IMPRESSION:  1. Dentalgia    2. Dental infection      Discharge Medication List as of 7/18/2022  6:56 PM      START taking these medications    Details   naproxen (NAPROSYN) 500 mg tablet Take 1 Tablet by mouth two (2) times daily (with meals) for 14 days. , Normal, Disp-28 Tablet, R-0      chlorhexidine (Peridex) 0.12 % solution 15 mL by Swish and Spit route every twelve (12) hours for 14 days. , Normal, Disp-420 mL, R-0      aluminum-magnesium hydroxide 200-200 mg/5 mL susp 5 mL, diphenhydrAMINE 12.5 mg/5 mL liqd 12.5 mg, lidocaine 2 % soln 5 mL 5 mL by Swish and Spit route two (2) times a day. Magic mouth wash   Maalox  Lidocaine 2% viscous   Diphenhydramine oral solution     Pharmacy to mix equal portions of ingredients to a total volume as indicated in the dispense amount. , Print, Disp-150 mL , R-0      penicillin v potassium (VEETID) 500 mg tablet Take 1 Tablet by mouth four (4) times daily for 7 days. , Normal, Disp-28 Tablet, R-0         CONTINUE these medications which have NOT CHANGED    Details   cloNIDine HCl (CATAPRES) 0.1 mg tablet Historical Med      lisinopril (PRINIVIL, ZESTRIL) 10 mg tablet Take 40 mg by mouth., Historical Med      dextroamphetamine-amphetamine (ADDERALL) 10 mg tablet Take 30 mg by mouth two (2) times a day.        , Historical Med      TENS Units (TENS 48) jazzmine 23-year-old female with chronic lower back pain. Please provide her with a TENS unit to use to help improve her function, improve quality of life, decrease pain, and decreased medication usage. Thank you, Print, Disp-1 Device, R-0             Disposition: Home    Patient condition at time of disposition: Stable    DISCHARGE NOTE:   Pt has been reexamined. Patient has no new complaints, changes, or physical findings. Care plan outlined and precautions discussed. Results were reviewed with the patient. All medications were reviewed with the patient. All of pt's questions and concerns were addressed. Alarm symptoms and return precautions associated with chief complaint and evaluation were reviewed with the patient in detail. The patient demonstrated adequate understanding. Patient was instructed to follow up with Dentistry, as well as strict return precautions to the ED upon further deterioration. Patient is ready to go home. The patient is happy with this plan          Dragon Disclaimer     Please note that this dictation was completed with [employer], the [employer] voice recognition software.   Quite often unanticipated grammatical, syntax, homophones, and other interpretive errors are inadvertently transcribed by the computer software. Please disregard these errors. Please excuse any errors that have escaped final proofreading. Christina MYERS.

## 2022-12-31 ENCOUNTER — HOSPITAL ENCOUNTER (EMERGENCY)
Age: 52
Discharge: HOME | End: 2022-12-31
Attending: STUDENT IN AN ORGANIZED HEALTH CARE EDUCATION/TRAINING PROGRAM
Payer: MEDICARE

## 2022-12-31 VITALS
RESPIRATION RATE: 16 BRPM | HEART RATE: 79 BPM | TEMPERATURE: 98.9 F | WEIGHT: 205 LBS | HEIGHT: 63 IN | SYSTOLIC BLOOD PRESSURE: 150 MMHG | BODY MASS INDEX: 36.32 KG/M2 | DIASTOLIC BLOOD PRESSURE: 84 MMHG

## 2022-12-31 DIAGNOSIS — B02.21 RAMSAY HUNT SYNDROME (GENICULATE HERPES ZOSTER): Primary | ICD-10-CM

## 2022-12-31 PROCEDURE — 74011250637 HC RX REV CODE- 250/637: Performed by: STUDENT IN AN ORGANIZED HEALTH CARE EDUCATION/TRAINING PROGRAM

## 2022-12-31 PROCEDURE — 99283 EMERGENCY DEPT VISIT LOW MDM: CPT

## 2022-12-31 RX ORDER — NAPROXEN 250 MG/1
500 TABLET ORAL ONCE
Status: COMPLETED | OUTPATIENT
Start: 2022-12-31 | End: 2022-12-31

## 2022-12-31 RX ORDER — HYDROCODONE BITARTRATE AND ACETAMINOPHEN 5; 325 MG/1; MG/1
1 TABLET ORAL ONCE
Status: COMPLETED | OUTPATIENT
Start: 2022-12-31 | End: 2022-12-31

## 2022-12-31 RX ORDER — VALACYCLOVIR HYDROCHLORIDE 1 G/1
1000 TABLET, FILM COATED ORAL 3 TIMES DAILY
Qty: 21 TABLET | Refills: 0 | Status: SHIPPED | OUTPATIENT
Start: 2022-12-31 | End: 2023-01-07

## 2022-12-31 RX ORDER — PREDNISONE 20 MG/1
60 TABLET ORAL DAILY
Qty: 21 TABLET | Refills: 0 | Status: SHIPPED | OUTPATIENT
Start: 2022-12-31 | End: 2023-01-07

## 2022-12-31 RX ADMIN — HYDROCODONE BITARTRATE AND ACETAMINOPHEN 1 TABLET: 5; 325 TABLET ORAL at 12:33

## 2022-12-31 RX ADMIN — NAPROXEN 500 MG: 250 TABLET ORAL at 12:33

## 2022-12-31 NOTE — DISCHARGE INSTRUCTIONS
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 This does put you at higher risk for developing a facial palsy (like Bell's palsy). Steroids were sent to your pharmacy to help prevent this. Very important to take all medications as prescribed and to completion. Please follow-up with your doctor in the next week to ensure improvement and resolution of your symptoms. You are contagious until the vessels on your face start to scab over.     For pain recommend taking extra strength Tylenol every 4-6 hours and naproxen 500 mg twice daily Female

## 2022-12-31 NOTE — ED PROVIDER NOTES
HPI   Patient is a 49-year-old female who presents with a rash and ear pain to the left side of her face. She is concerned that she is having allergic hair dye that she used a couple days ago. She states it started off as a headache on the left side and progressed to a deeper ear pain. Yesterday she broke out into a rash across her face. This been going on for approximately 4 days. She denies any fever, sweats or chills. States that her rash is more painful as opposed itchy. She is denies any history of chickenpox. Has not had her shingles vaccine. Denies any trouble with her hearing or ringing in her ears. Denies any recent cold or flu symptoms.   Past Medical History:   Diagnosis Date    Anemia     Chronic low back pain     Constipation     Dysmenorrhea     Eczema     Fatigue     Hypertension     Insomnia     Iron deficiency anemia     Neurological disorder     pain, difficulty writing    Secondary thrombocytosis        Past Surgical History:   Procedure Laterality Date    HX ABDOMINOPLASTY      HX BREAST REDUCTION      HX GASTRIC BYPASS  1997    HX HYSTERECTOMY      HX HYSTERECTOMY  2012    HX ORTHOPAEDIC      left foot reconstruction     HX TUBAL LIGATION      bilateral         Family History:   Problem Relation Age of Onset    Cancer Other         NOS    Heart Disease Other         NOS    Stroke Father     Heart Attack Father     Breast Cancer Mother        Social History     Socioeconomic History    Marital status:      Spouse name: Not on file    Number of children: Not on file    Years of education: Not on file    Highest education level: Not on file   Occupational History    Not on file   Tobacco Use    Smoking status: Never    Smokeless tobacco: Never   Substance and Sexual Activity    Alcohol use: No    Drug use: No    Sexual activity: Yes     Birth control/protection: Surgical     Comment: Hysterectomy   Other Topics Concern    Not on file   Social History Narrative    Not on file Social Determinants of Health     Financial Resource Strain: Not on file   Food Insecurity: Not on file   Transportation Needs: Not on file   Physical Activity: Not on file   Stress: Not on file   Social Connections: Not on file   Intimate Partner Violence: Not on file   Housing Stability: Not on file         ALLERGIES: Latex and Ibuprofen    Review of Systems  Constitutional: No fever  HENT: No ear pain  Eyes: No change in vision  Respiratory: No SOB  Cardio: No chest pain  GI: No blood in stool  : No hematuria  MSK: No back pain  Skin: Positive for rash  Neuro: No headache    Vitals:    12/31/22 1132 12/31/22 1132   BP: (!) 150/84    Pulse: 79    Resp: 16    Temp: 98.9 °F (37.2 °C)    Weight:  93 kg (205 lb)   Height:  5' 3\" (1.6 m)            Physical Exam   General: No acute distress  Head: Normocephalic, atraumatic  Psych: Cooperative and alert  Eyes: No scleral icterus, normal conjunctiva  ENT: Moist oral mucosa, no rashes or lesions, some irritation and lesions noted in the external auditory canal on the left-hand side  Neck: Supple  CV: Regular rate and rhythm  Pulm: Clear breath sounds bilaterally without any wheezing or rhonchi, normal respiratory rate  GI: Normal bowel sounds, soft, non-tender  MSK: Moves all four extremities  Skin: Vesicular type rash covering the middle aspect of the left side of the face, not crossing midline  Neuro: Alert and conversive face is symmetrical bilaterally    MDM  Patient is a 69-year-old female who presents with what appears to be shingles. In the area of distribution affected involves the ear is concerning for Lola Hunt syndrome. Patient does not have any signs of a facial palsy at this time. The possibility of this was discussed with the patient. She discussed the importance of taking medications fully and to completion of following up with her doctor. She will be given naproxen and Norco here for her pain. Patient stable for discharge at this time. Patient is in agreement with the plan to be discharged at this time. All the patient's questions were answered. Patient was given written instructions on the diagnosis, and states understanding of the plan moving forward. We did discuss important signs and symptoms that should prompt quick return to the emergency department. Disposition: Patient was discharged home in stable condition.   They will follow up with their primary care physician    Prescriptions: Valacyclovir, prednisone    Diagnosis: Lola Mccauley syndrome  Procedures

## 2022-12-31 NOTE — ED TRIAGE NOTES
L side of face is swollen, bumps to L side of face, L side of throat,back, neck, L SHAW, L ear pain. Only thing new is hair dye.

## 2023-08-28 NOTE — MR AVS SNAPSHOT
[address] [address] 
[phone] Patient: Kristin Araya MRN: [id number] Visit Information Date & Time Provider Department Dept. Phone Encounter #  
 5/10/2018  1:00 PM Lenore Marcos, [address] [facility] [id number] 20 16 33 Follow-up Instructions Return in about 4 weeks (around 6/7/2018). Upcoming Health Maintenance Date Due DTaP/Tdap/Td series (1 - Tdap) 5/31/1989 PAP AKA CERVICAL CYTOLOGY 5/31/1989 MEDICARE YEARLY EXAM 3/14/2018 Influenza Age 5 to Adult 8/1/2018 Allergies as of 5/10/2018  Review Complete On: 5/10/2018 By: Lenore Marcos DO Severity Noted Reaction Type Reactions Latex  10/31/2011    Rash Ibuprofen  06/26/2014    Nausea and Vomiting Current Immunizations  Never Reviewed No immunizations on file. Not reviewed this visit You Were Diagnosed With   
  
 Codes Comments Encounter for long-term (current) use of high-risk medication    -  Primary ICD-10-CM: C05.050 ICD-9-CM: V58.69 Chronic pain syndrome     ICD-10-CM: G89.4 ICD-9-CM: 338.4 Spondylosis of lumbar spine     ICD-10-CM: M47.816 ICD-9-CM: 721.3 Piriformis muscle pain     ICD-10-CM: M79.1 ICD-9-CM: 729.1 Sacroiliitis ([facility] [address]     ICD-10-CM: M46.1 ICD-9-CM: 720.2 Chronic bilateral low back pain with bilateral sciatica     ICD-10-CM: M54.42, M54.41, G89.29 ICD-9-CM: 724.2, 724.3, 338.29 Vitals BP Pulse Temp Resp Height(growth percentile) Weight(growth percentile) 147/81 (BP 1 Location: Left arm, BP Patient Position: Sitting) 64 96.5 °F (35.8 °C) (Oral) 14 5' 4\" (1.626 m) 151 lb (68.5 kg) BMI OB Status Smoking Status 25.92 kg/m2 Hysterectomy Never Smoker Vitals History BMI and BSA Data Body Mass Index Body Surface Area  
 25.92 kg/m 2 1.76 m 2 Preferred Pharmacy Pharmacy Name Phone [facility], [address] Your Updated Medication List  
  
   
This list is accurate as of 5/10/18  2:43 PM.  Always use your most recent med list.  
  
  
  
  
 ADDERALL 10 mg tablet Generic drug:  dextroamphetamine-amphetamine Take 10 mg by mouth daily. Back Brace Misc  
1 Units by Does Not Apply route daily. As directed  
  
 baclofen 10 mg tablet Commonly known as:  LIORESAL Take 1 Tab by mouth three (3) times daily as needed. docusate sodium 100 mg capsule Commonly known as:  Leilani Silk Take 1 Cap by mouth two (2) times a day for 90 days. * fentaNYL 100 mcg/hr PATCH Commonly known as:  DURAGESIC  
1 Patch by TransDERmal route every fourty-eight (48) hours for 30 days. For chronic pain. For chronic pain. Mylan, Sandoz, or [place] brands only. * fentaNYL 75 mcg/hr Commonly known as:  DURAGESIC  
1 Patch by TransDERmal route every fourty-eight (48) hours for 30 days. Max Daily Amount: 1 Patch. For chronic pain  Indications: Chronic Pain with Opioid Tolerance Start taking on:  6/9/2018  
  
 naloxone 4 mg/actuation nasal spray Commonly known as:  NARCAN  
4 mg by Nasal route as needed. NORVASC 5 mg tablet Generic drug:  amLODIPine Take 5 mg by mouth daily. oxyCODONE-acetaminophen  mg per tablet Commonly known as:  PERCOCET 10 Take 1 Tab by mouth five (5) times daily for 30 days. Max Daily Amount: 5 Tabs. RESTORIL 15 mg capsule Generic drug:  temazepam  
Take 15 mg by mouth nightly as needed for Sleep.  
  
 senna-docusate 8.6-50 mg per tablet Commonly known as:  PERICOLACE  
1 tablet p.o. daily as needed for constipation TENS Units [employer] Commonly known as:  TENS 48  
25-year-old female with chronic lower back pain.   Please provide her with a TENS unit to use to help improve her function, improve quality of life, decrease pain, and decreased medication usage. Thank you  
  
 warfarin 2.5 mg tablet Commonly known as:  COUMADIN Take 2.5 mg by mouth daily. ZOLOFT 50 mg tablet Generic drug:  sertraline Take 50 mg by mouth daily. * Notice: This list has 2 medication(s) that are the same as other medications prescribed for you. Read the directions carefully, and ask your doctor or other care provider to review them with you. Prescriptions Printed Refills  
 fentaNYL (DURAGESIC) 75 mcg/hr 0 Starting on: 2018 Si Patch by TransDERmal route every fourty-eight (48) hours for 30 days. Max Daily Amount: 1 Patch. For chronic pain  Indications: Chronic Pain with Opioid Tolerance Class: Print Route: TransDERmal  
 oxyCODONE-acetaminophen (PERCOCET 10)  mg per tablet 0 Sig: Take 1 Tab by mouth five (5) times daily for 30 days. Max Daily Amount: 5 Tabs. Class: Print Route: Oral  
 TENS Units (TENS 504) jazzmine 0 Si-year-old female with chronic lower back pain. Please provide her with a TENS unit to use to help improve her function, improve quality of life, decrease pain, and decreased medication usage. Thank you Class: Print Prescriptions Sent to Pharmacy Refills  
 docusate sodium (COLACE) 100 mg capsule 2 Sig: Take 1 Cap by mouth two (2) times a day for 90 days. Class: Normal  
 Pharmacy: [address] [place] [address], [address] [place] [address] Ph #: [phone] Route: Oral  
 senna-docusate (PERICOLACE) 8.6-50 mg per tablet 2 Si tablet p.o. daily as needed for constipation Class: Normal  
 Pharmacy: [address] [place] [address], [address] [place] [address] Ph #: [phone] We Performed the Following AMB POC DRUG SCREEN () [ [facility]] DRUG SCREEN [[id number] Custom] REFERRAL TO CHIROPRACTIC [REF16 Custom]  Comments:  
 40-year-old female with chronic lower back pain, right-sided sacroiliitis, left-sided piriformis soreness. She has no history of lumbosacral surgeries. She has never tried chiropractic to help with her chronic pain syndrome. Please evaluate and treat as indicated thank you Follow-up Instructions Return in about 4 weeks (around 6/7/2018). Referral Information Referral ID Referred By Referred To  
  
 [id number] Abi Turner Not Available Visits Status Start Date End Date 1 New Request 5/10/18 5/10/19 If your referral has a status of pending review or denied, additional information will be sent to support the outcome of this decision. Patient Instructions Safe Use of Opioid Pain Medicine: Care Instructions Your Care Instructions Pain is your body's way of warning you that something is wrong. Pain feels different for everybody. Only you can describe your pain. A doctor can suggest or prescribe many types of medicines for pain. These range from over-the-counter medicines like acetaminophen (Tylenol) to powerful medicines called opioids. Examples of opioids are fentanyl, hydrocodone, morphine, and oxycodone. Heroin is an illegal opioid Opioids are strong medicines. They can help you manage pain when you use them the right way. But if you misuse them, they can cause serious harm and even death. For these reasons, doctors are very careful about how they prescribe opioids. If you decide to take opioids, here are some things to remember. · Keep your doctor informed. You can get addicted to opioids. The risk is higher if you have a history of substance use. Your doctor will monitor you closely for signs of misuse and addiction and to figure out when you no longer need to take opioids. · Make a treatment plan. The goal of your plan is to be able to function and do the things you need to do, even if you still have some pain.  You might be able to manage your pain with other non-opioid options like physical therapy, relaxation, or over-the-counter pain medicines. · Be aware of the side effects. Opioids can cause serious side effects, such as constipation, dry mouth, and nausea. And over time, you may need a higher dose to get pain relief. This is called tolerance. Your body also gets used to opioids. This is called physical dependence. If you suddenly stop taking them, you may have withdrawal symptoms. The doctor carefully considered what pain medicine is right for you. You may not have received opioids if your doctor was concerned about drug interactions or your safety, or if he or she had other concerns. It is best to have one doctor or clinic treat your pain. This way you will get the pain medicine that will help you the most. And a doctor will be able to watch for any problems that the medicine might cause. The doctor has checked you carefully, but problems can develop later. If you notice any problems or new symptoms,  get medical treatment right away. Follow-up care is a key part of your treatment and safety. Be sure to make and go to all appointments, and call your doctor if you are having problems. It's also a good idea to know your test results and keep a list of the medicines you take. How can you care for yourself at home? · If you need to take opioids to manage your pain, remember these safety tips. ¨ Follow directions carefully. It's easy to misuse opioids if you take a dose other than what's prescribed by your doctor. This can lead to overdose and even death. Even sharing them with someone they weren't meant for is misuse. ¨ Be cautious. Opioids may affect your judgment and decision making. Do not drive or operate machinery until you can think clearly. Talk with your doctor about when it is safe to drive. ¨ Reduce the risk of drug interactions.  Opioids can be dangerous if you take them with alcohol or with certain drugs like sleeping pills and muscle relaxers. Make sure your doctor knows about all the other medicines you take, including over-the-counter medicines. Don't start any new medicines before you talk to your doctor or pharmacist. 
Sushil Nelson Keep others safe. Store opioids in a safe and secure place. Make sure that pets, children, friends, and family can't get to them. When you're done using opioids, make sure to properly dispose of them. You can either use a community drug take-back program or your drugstore's mail-back program. If one of these programs isn't available, you can flush opioid skin patches and unused opioid pills down the toilet. ¨ Reduce the risk of overdose. Misuse of opioids can be very dangerous. Protect yourself by asking your doctor about a naloxone rescue kit. It can help you-and even save your life-if you take too much of an opioid. · Try other ways to reduce pain. ¨ Relax, and reduce stress. Relaxation techniques such as deep breathing or meditation can help. ¨ Keep moving. Gentle, daily exercise can help reduce pain over the long run. Try low- or no-impact exercises such as walking, swimming, and stationary biking. Do stretches to stay flexible. ¨ Try heat, cold packs, and massage. ¨ Get enough sleep. Pain can make you tired and drain your energy. Talk with your doctor if you have trouble sleeping because of pain. ¨ Think positive. Your thoughts can affect your pain level. Do things that you enjoy to distract yourself when you have pain instead of focusing on the pain. See a movie, read a book, listen to music, or spend time with a friend. · If you are not taking a prescription pain medicine, ask your doctor if you can take an over-the-counter medicine. When should you call for help? Call your doctor now or seek immediate medical care if: 
? · You have a new kind of pain. ? · You have new symptoms, such as a fever or rash, along with the pain. ? Watch closely for changes in your health, and be sure to contact your doctor if: 
? · You think you might be using too much pain medicine, and you need help to use less or stop. ? · Your pain gets worse. ? · You would like a referral to a doctor or clinic that specializes in pain management. Where can you learn more? Go to http://christiane-maryanne.info/. Enter R108 in the search box to learn more about \"Safe Use of Opioid Pain Medicine: Care Instructions. \" Current as of: October 14, 2016 Content Version: 11.4 © 2928-5331 [employer]). Care instructions adapted under license by [employer] (which disclaims liability or warranty for this information). If you have questions about a medical condition or this instruction, always ask your healthcare professional. [address] any warranty or liability for your use of this information. Learning About Sleeping Well What does sleeping well mean? Sleeping well means getting enough sleep. How much sleep is enough varies among people. The number of hours you sleep is not as important as how you feel when you wake up. If you do not feel refreshed, you probably need more sleep. Another sign of not getting enough sleep is feeling tired during the day. The average total nightly sleep time is 7½ to 8 hours. Healthy adults may need a little more or a little less than this. Why is getting enough sleep important? Getting enough quality sleep is a basic part of good health. When your sleep suffers, your mood and your thoughts can suffer too. You may find yourself feeling more grumpy or stressed. Not getting enough sleep also can lead to serious problems, including injury, accidents, anxiety, and depression. What might cause poor sleeping? Many things can cause sleep problems, including: · Stress. Stress can be caused by fear about a single event, such as giving a speech. Or you may have ongoing stress, such as worry about work or school. · Depression, anxiety, and other mental or emotional conditions. · Changes in your sleep habits or surroundings. This includes changes that happen where you sleep, such as noise, light, or sleeping in a different bed. It also includes changes in your sleep pattern, such as having jet lag or working a late shift. · Health problems, such as pain, breathing problems, and restless legs syndrome. · Lack of regular exercise. How can you help yourself? Here are some tips that may help you sleep more soundly and wake up feeling more refreshed. Your sleeping area · Use your bedroom only for sleeping and sex. A bit of light reading may help you fall asleep. But if it doesn't, do your reading elsewhere in the house. Don't watch TV in bed. · Be sure your bed is big enough to stretch out comfortably, especially if you have a sleep partner. · Keep your bedroom quiet, dark, and cool. Use curtains, blinds, or a sleep mask to block out light. To block out noise, use earplugs, soothing music, or a \"white noise\" machine. Your evening and bedtime routine · Create a relaxing bedtime routine. You might want to take a warm shower or bath, listen to soothing music, or drink a cup of noncaffeinated tea. · Go to bed at the same time every night. And get up at the same time every morning, even if you feel tired. What to avoid · Limit caffeine (coffee, tea, caffeinated sodas) during the day, and don't have any for at least 4 to 6 hours before bedtime. · Don't drink alcohol before bedtime. Alcohol can cause you to wake up more often during the night. · Don't smoke or use tobacco, especially in the evening. Nicotine can keep you awake. · Don't take naps during the day, especially close to bedtime. · Don't lie in bed awake for too long. If you can't fall asleep, or if you wake up in the middle of the night and can't get back to sleep within 15 minutes or so, get out of bed and go to another room until you feel sleepy. · Don't take medicine right before bed that may keep you awake or make you feel hyper or energized. Your doctor can tell you if your medicine may do this and if you can take it earlier in the day. If you can't sleep · Imagine yourself in a peaceful, pleasant scene. Focus on the details and feelings of being in a place that is relaxing. · Get up and do a quiet or boring activity until you feel sleepy. · Don't drink any liquids after 6 p.m. if you wake up often because you have to go to the bathroom. Where can you learn more? Go to http://christiane-maryanne.info/. Enter M444 in the search box to learn more about \"Learning About Sleeping Well. \" Current as of: May 12, 2017 Content Version: 11.4 © 0784-7716 [employer]. Care instructions adapted under license by [employer] (which disclaims liability or warranty for this information). If you have questions about a medical condition or this instruction, always ask your healthcare professional. [address] any warranty or liability for your use of this information. Introducing [facility]! [facility] introduces "[employer]" patient portal. Now you can access parts of your medical record, email your doctor's office, and request medication refills online. 1. In your internet browser, go to [email or web address] 2. Click on the First Time User? Click Here link in the Sign In box. You will see the New Member Sign Up page. 3. Enter your "[employer]" Access Code exactly as it appears below. You will not need to use this code after youve completed the sign-up process. If you do not sign up before the expiration date, you must request a new code. · "[employer]" Access Code: [email or web address] Expires: 8/8/2018  2:30 PM 
 
4. Enter the last four digits of your Social Security Number (xxxx) and Date of Birth (mm/dd/yyyy) as indicated and click Submit.  You will be taken to the next sign-up page. 5. Create a [employer] ID. This will be your [employer] login ID and cannot be changed, so think of one that is secure and easy to remember. 6. Create a [employer] password. You can change your password at any time. 7. Enter your Password Reset Question and Answer. This can be used at a later time if you forget your password. 8. Enter your e-mail address. You will receive e-mail notification when new information is available in [address]. 9. Click Sign Up. You can now view and download portions of your medical record. 10. Click the Download Summary menu link to download a portable copy of your medical information. If you have questions, please visit the Frequently Asked Questions section of the [employer] website. Remember, [employer] is NOT to be used for urgent needs. For medical emergencies, dial 911. Now available from your iPhone and Android! Please provide this summary of care documentation to your next provider. Your primary care clinician is listed as Georgette Ortega. If you have any questions after today's visit, please call [phone]. Wartpeel Counseling:  I discussed with the patient the risks of Wartpeel including but not limited to erythema, scaling, itching, weeping, crusting, and pain.

## 2023-10-29 ENCOUNTER — HOSPITAL ENCOUNTER (EMERGENCY)
Facility: HOSPITAL | Age: 53
Discharge: HOME | End: 2023-10-29
Attending: EMERGENCY MEDICINE
Payer: MEDICARE

## 2023-10-29 VITALS
SYSTOLIC BLOOD PRESSURE: 132 MMHG | HEIGHT: 66 IN | WEIGHT: 207 LBS | DIASTOLIC BLOOD PRESSURE: 88 MMHG | OXYGEN SATURATION: 99 % | TEMPERATURE: 98.2 F | RESPIRATION RATE: 16 BRPM | BODY MASS INDEX: 33.27 KG/M2 | HEART RATE: 72 BPM

## 2023-10-29 DIAGNOSIS — S39.012A BACK STRAIN, INITIAL ENCOUNTER: Primary | ICD-10-CM

## 2023-10-29 LAB
APPEARANCE UR: CLEAR
BILIRUB UR QL: NEGATIVE
COLOR UR: YELLOW
EPITH CASTS URNS QL MICRO: NORMAL /LPF (ref 0–5)
GLUCOSE UR STRIP.AUTO-MCNC: NEGATIVE MG/DL
HGB UR QL STRIP: NEGATIVE
KETONES UR QL STRIP.AUTO: NEGATIVE MG/DL
LEUKOCYTE ESTERASE UR QL STRIP.AUTO: ABNORMAL
NITRITE UR QL STRIP.AUTO: NEGATIVE
PH UR STRIP: 6.5 (ref 5–8)
PROT UR STRIP-MCNC: NEGATIVE MG/DL
RBC #/AREA URNS HPF: NORMAL /HPF (ref 0–5)
SP GR UR REFRACTOMETRY: 1.01 (ref 1–1.03)
UROBILINOGEN UR QL STRIP.AUTO: 1 EU/DL (ref 0.2–1)
WBC URNS QL MICRO: NORMAL /HPF (ref 0–4)

## 2023-10-29 PROCEDURE — 96372 THER/PROPH/DIAG INJ SC/IM: CPT

## 2023-10-29 PROCEDURE — 6360000002 HC RX W HCPCS: Performed by: EMERGENCY MEDICINE

## 2023-10-29 PROCEDURE — 6370000000 HC RX 637 (ALT 250 FOR IP): Performed by: EMERGENCY MEDICINE

## 2023-10-29 PROCEDURE — 81001 URINALYSIS AUTO W/SCOPE: CPT

## 2023-10-29 PROCEDURE — 99284 EMERGENCY DEPT VISIT MOD MDM: CPT

## 2023-10-29 RX ORDER — METHOCARBAMOL 750 MG/1
1500 TABLET, FILM COATED ORAL
Status: COMPLETED | OUTPATIENT
Start: 2023-10-29 | End: 2023-10-29

## 2023-10-29 RX ORDER — KETOROLAC TROMETHAMINE 15 MG/ML
15 INJECTION, SOLUTION INTRAMUSCULAR; INTRAVENOUS
Status: COMPLETED | OUTPATIENT
Start: 2023-10-29 | End: 2023-10-29

## 2023-10-29 RX ORDER — OXYCODONE HYDROCHLORIDE AND ACETAMINOPHEN 5; 325 MG/1; MG/1
1 TABLET ORAL
Status: COMPLETED | OUTPATIENT
Start: 2023-10-29 | End: 2023-10-29

## 2023-10-29 RX ORDER — LIDOCAINE 4 G/G
1 PATCH TOPICAL DAILY
Qty: 10 EACH | Refills: 0 | Status: SHIPPED | OUTPATIENT
Start: 2023-10-29 | End: 2023-11-08

## 2023-10-29 RX ORDER — LIDOCAINE 4 G/G
1 PATCH TOPICAL
Status: DISCONTINUED | OUTPATIENT
Start: 2023-10-29 | End: 2023-10-29 | Stop reason: HOSPADM

## 2023-10-29 RX ORDER — OXYCODONE HYDROCHLORIDE AND ACETAMINOPHEN 5; 325 MG/1; MG/1
1 TABLET ORAL EVERY 6 HOURS PRN
Qty: 8 TABLET | Refills: 0 | Status: SHIPPED | OUTPATIENT
Start: 2023-10-29 | End: 2023-11-01

## 2023-10-29 RX ORDER — AMLODIPINE BESYLATE 2.5 MG/1
2.5 TABLET ORAL DAILY
COMMUNITY

## 2023-10-29 RX ADMIN — KETOROLAC TROMETHAMINE 15 MG: 15 INJECTION, SOLUTION INTRAMUSCULAR; INTRAVENOUS at 10:53

## 2023-10-29 RX ADMIN — OXYCODONE AND ACETAMINOPHEN 1 TABLET: 5; 325 TABLET ORAL at 11:41

## 2023-10-29 RX ADMIN — METHOCARBAMOL TABLETS 1500 MG: 750 TABLET, COATED ORAL at 10:53

## 2023-10-29 ASSESSMENT — ENCOUNTER SYMPTOMS: BACK PAIN: 1

## 2023-10-29 ASSESSMENT — PAIN - FUNCTIONAL ASSESSMENT: PAIN_FUNCTIONAL_ASSESSMENT: 0-10

## 2023-10-29 ASSESSMENT — PAIN SCALES - GENERAL: PAINLEVEL_OUTOF10: 10

## 2023-10-29 NOTE — ED PROVIDER NOTES
with PCP. Patient has no signs of UTI on urinalysis. Differential diagnosis UTI sciatica abscess      Amount and/or Complexity of Data Reviewed  Labs: ordered. Risk  OTC drugs. Prescription drug management. REASSESSMENT        CONSULTS:  None    PROCEDURES:  Unless otherwise noted below, none     Procedures      FINAL IMPRESSION      1. Back strain, initial encounter          DISPOSITION/PLAN   DISPOSITION Decision To Discharge 10/29/2023 12:20:33 PM      PATIENT REFERRED TO:  Meredith Oates MD  [address], [address]  [address] [place] [address]  [phone]    Call in 1 day        DISCHARGE MEDICATIONS:  Discharge Medication List as of 10/29/2023 12:40 PM        START taking these medications    Details   oxyCODONE-acetaminophen (PERCOCET) 5-325 MG per tablet Take 1 tablet by mouth every 6 hours as needed for Pain for up to 3 days. Intended supply: 3 days. Take lowest dose possible to manage pain Max Daily Amount: 4 tablets, Disp-8 tablet, R-0Normal      lidocaine 4 % external patch Place 1 patch onto the skin daily for 10 days, TransDERmal, DAILY Starting Sun 10/29/2023, Until Wed 11/8/2023, For 10 days, Disp-10 each, R-0, Normal           Controlled Substances Monitoring:          No data to display                (Please note that portions of this note were completed with a voice recognition program.  Efforts were made to edit the dictations but occasionally words are mis-transcribed. )    Kami Lundy MD (electronically signed)  Attending Emergency Physician           Kami Lundy MD  10/31/23 [id number]

## 2023-10-29 NOTE — DISCHARGE INSTRUCTIONS
Come back if you get worse. Follow-up without fail. Use lidocaine patch over-the-counter 600 ibuprofen Percocet for breakthrough pain.

## 2023-10-29 NOTE — ED NOTES
Patient up for discharge. Discharge instructions reviewed with patient and patient verbalized understanding of. Education taught to: patient  Patient education was completed: yes  Pain assessment on discharge was 0. Condition stable. Patient discharged to home. Patient was discharged ambulatory. Valuables with patient      Medication List        START taking these medications      lidocaine 4 % external patch  Place 1 patch onto the skin daily for 10 days     oxyCODONE-acetaminophen 5-325 MG per tablet  Commonly known as: Percocet  Take 1 tablet by mouth every 6 hours as needed for Pain for up to 3 days. Intended supply: 3 days.  Take lowest dose possible to manage pain Max Daily Amount: 4 tablets            ASK your doctor about these medications      amLODIPine 2.5 MG tablet  Commonly known as: NORVASC     amphetamine-dextroamphetamine 10 MG tablet  Commonly known as: ADDERALL     cloNIDine 0.1 MG tablet  Commonly known as: CATAPRES     lisinopril 10 MG tablet  Commonly known as: PRINIVIL;ZESTRIL               Where to Get Your Medications        These medications were sent to [address] [place] [address] #[id number] - [address]      Phone: [phone]   lidocaine 4 % external patch  oxyCODONE-acetaminophen 5-325 MG per tablet           Kristy Lorenzana RN  10/29/23 [id number]

## 2023-10-29 NOTE — ED TRIAGE NOTES
Stabbing lower left back pain, non radiating, began last night when patient went to urinate. States at this time she felt a 'Pulling\" in her genitals when she urinated but has not had this sensation since. History of kidney infection.

## 2025-03-26 ENCOUNTER — TELEPHONE (OUTPATIENT)
Age: 55
End: 2025-03-26